# Patient Record
Sex: MALE | Race: WHITE | Employment: FULL TIME | URBAN - METROPOLITAN AREA
[De-identification: names, ages, dates, MRNs, and addresses within clinical notes are randomized per-mention and may not be internally consistent; named-entity substitution may affect disease eponyms.]

---

## 2019-03-27 LAB
ALBUMIN SERPL-MCNC: 4.2 G/DL
ALP BLD-CCNC: 57 U/L
ALT SERPL-CCNC: 30 U/L
ANION GAP SERPL CALCULATED.3IONS-SCNC: NORMAL MMOL/L
AST SERPL-CCNC: 25 U/L
BILIRUB SERPL-MCNC: 1 MG/DL (ref 0.1–1.4)
BUN BLDV-MCNC: 15 MG/DL
CALCIUM SERPL-MCNC: 9 MG/DL
CHLORIDE BLD-SCNC: 109 MMOL/L
CHOLESTEROL, TOTAL: 145 MG/DL
CHOLESTEROL/HDL RATIO: NORMAL
CO2: NORMAL MMOL/L
CREAT SERPL-MCNC: 1.2 MG/DL
GFR CALCULATED: NORMAL
GLUCOSE BLD-MCNC: 88 MG/DL
HDLC SERPL-MCNC: 53 MG/DL (ref 35–70)
LDL CHOLESTEROL CALCULATED: 84 MG/DL (ref 0–160)
POTASSIUM SERPL-SCNC: 4.2 MMOL/L
SODIUM BLD-SCNC: 139 MMOL/L
TOTAL PROTEIN: 7.5
TRIGL SERPL-MCNC: 38 MG/DL
VLDLC SERPL CALC-MCNC: NORMAL MG/DL

## 2019-04-16 ENCOUNTER — INITIAL CONSULT (OUTPATIENT)
Dept: CARDIOLOGY CLINIC | Age: 24
End: 2019-04-16
Payer: COMMERCIAL

## 2019-04-16 VITALS
WEIGHT: 196 LBS | BODY MASS INDEX: 25.15 KG/M2 | HEART RATE: 76 BPM | HEIGHT: 74 IN | SYSTOLIC BLOOD PRESSURE: 170 MMHG | DIASTOLIC BLOOD PRESSURE: 80 MMHG

## 2019-04-16 DIAGNOSIS — I51.7 LVH (LEFT VENTRICULAR HYPERTROPHY): Primary | ICD-10-CM

## 2019-04-16 PROCEDURE — 99243 OFF/OP CNSLTJ NEW/EST LOW 30: CPT | Performed by: INTERNAL MEDICINE

## 2019-04-16 RX ORDER — AMLODIPINE BESYLATE 5 MG/1
5 TABLET ORAL DAILY
Qty: 30 TABLET | Refills: 3 | Status: SHIPPED | OUTPATIENT
Start: 2019-04-16 | End: 2019-08-19 | Stop reason: SDUPTHER

## 2019-04-16 SDOH — HEALTH STABILITY: MENTAL HEALTH: HOW OFTEN DO YOU HAVE A DRINK CONTAINING ALCOHOL?: MONTHLY OR LESS

## 2019-05-02 ENCOUNTER — PROCEDURE VISIT (OUTPATIENT)
Dept: CARDIOLOGY CLINIC | Age: 24
End: 2019-05-02
Payer: COMMERCIAL

## 2019-05-02 DIAGNOSIS — I51.7 LVH (LEFT VENTRICULAR HYPERTROPHY): ICD-10-CM

## 2019-05-02 DIAGNOSIS — I10 ESSENTIAL HYPERTENSION: Primary | ICD-10-CM

## 2019-05-02 DIAGNOSIS — I51.7 LVH (LEFT VENTRICULAR HYPERTROPHY): Primary | ICD-10-CM

## 2019-05-02 LAB
LV EF: 58 %
LVEF MODALITY: NORMAL

## 2019-05-02 PROCEDURE — 93306 TTE W/DOPPLER COMPLETE: CPT | Performed by: INTERNAL MEDICINE

## 2019-05-02 PROCEDURE — 93976 VASCULAR STUDY: CPT | Performed by: INTERNAL MEDICINE

## 2019-05-07 ENCOUNTER — TELEPHONE (OUTPATIENT)
Dept: CARDIOLOGY CLINIC | Age: 24
End: 2019-05-07

## 2019-05-20 ENCOUNTER — TELEPHONE (OUTPATIENT)
Dept: CARDIOLOGY CLINIC | Age: 24
End: 2019-05-20

## 2019-05-20 NOTE — TELEPHONE ENCOUNTER
Called pt back and told him we can not see his labs if he had them done at [facility]. We recommend going there and getting a copy then bring them to office and we will scan them in.  Pt said he would go there today

## 2019-06-07 ENCOUNTER — TELEPHONE (OUTPATIENT)
Dept: CARDIOLOGY CLINIC | Age: 24
End: 2019-06-07

## 2019-06-07 NOTE — TELEPHONE ENCOUNTER
Patient is yting to get the results of labs we ordered , He had done @ [facility] on [address] they are telling him they don't send out results , He would have to go to [place] to  can we call and get results

## 2019-06-12 ENCOUNTER — TELEPHONE (OUTPATIENT)
Dept: CARDIOLOGY CLINIC | Age: 24
End: 2019-06-12

## 2019-06-12 NOTE — TELEPHONE ENCOUNTER
compunet refaxing results      Dr Debora Monique reviewed, left message for patient that results normal

## 2019-06-24 ENCOUNTER — OFFICE VISIT (OUTPATIENT)
Dept: CARDIOLOGY CLINIC | Age: 24
End: 2019-06-24
Payer: COMMERCIAL

## 2019-06-24 VITALS
HEIGHT: 74 IN | BODY MASS INDEX: 25.08 KG/M2 | SYSTOLIC BLOOD PRESSURE: 128 MMHG | DIASTOLIC BLOOD PRESSURE: 78 MMHG | WEIGHT: 195.4 LBS | HEART RATE: 84 BPM

## 2019-06-24 DIAGNOSIS — I10 ESSENTIAL HYPERTENSION: Primary | ICD-10-CM

## 2019-06-24 PROCEDURE — 99212 OFFICE O/P EST SF 10 MIN: CPT | Performed by: INTERNAL MEDICINE

## 2019-06-24 NOTE — PROGRESS NOTES
Irene Valencia MD        OFFICE  FOLLOWUP NOTE    Chief complaints: patient is here for management of HTN    Subjective: patient feels better, no chest pain, no shortness of breath, no dizziness, no palpitations    HPI Oly Terrell is a 21 y. o.year old who  has a past medical history of Diarrhea, H/O Doppler renal arterial ultrasound, H/O echocardiogram, Hypertension, and Lymphadenopathy. and presents for management of CAD, DM, HTN, AFIB, which are well controlled      Current Outpatient Medications   Medication Sig Dispense Refill    amLODIPine (NORVASC) 5 MG tablet Take 1 tablet by mouth daily 30 tablet 3     No current facility-administered medications for this visit. Allergies: Patient has no known allergies. Past Medical History:   Diagnosis Date    Diarrhea     H/O Doppler renal arterial ultrasound 05/02/2019    Normal renal artery doppler.  H/O echocardiogram 05/02/2019    EF 55-60%, Normal study.  Hypertension     Lymphadenopathy      History reviewed. No pertinent surgical history. History reviewed. No pertinent family history.   Social History     Tobacco Use    Smoking status: Never Smoker    Smokeless tobacco: Never Used   Substance Use Topics    Alcohol use: Yes     Frequency: Monthly or less      @ANN@  Review of Systems:   · Constitutional: No Fever or Weight Loss   · Eyes: No Decreased Vision  · ENT: No Headaches, Hearing Loss or Vertigo  · Cardiovascular: No chest pain, dyspnea on exertion, palpitations or loss of consciousness  · Respiratory: No cough or wheezing    · Gastrointestinal: No abdominal pain, appetite loss, blood in stools, constipation, diarrhea or heartburn  · Genitourinary: No dysuria, trouble voiding, or hematuria  · Musculoskeletal:  No gait disturbance, weakness or joint complaints  · Integumentary: No rash or pruritis  · Neurological: No TIA or stroke symptoms  · Psychiatric: No anxiety or depression  · Endocrine: No malaise, fatigue or temperature intolerance  · Hematologic/Lymphatic: No bleeding problems, blood clots or swollen lymph nodes  · Allergic/Immunologic: No nasal congestion or hives  All systems negative except as marked. Objective:  /78   Pulse 84   Ht 6' 2\" (1.88 m)   Wt 195 lb 6.4 oz (88.6 kg)   BMI 25.09 kg/m²   Wt Readings from Last 3 Encounters:   06/24/19 195 lb 6.4 oz (88.6 kg)   04/16/19 196 lb (88.9 kg)     Body mass index is 25.09 kg/m². GENERAL - Alert, oriented, pleasant, in no apparent distress,normal grooming  HEENT - pupils are reactive to light and accomodation, cornea intact, conjunctive normal color, ears are normal in exam,throat exam in normal, teeth, gum and palate are normal, oral mucosa is normal without any notation of pallor or cyanosis  Neck - Supple. No jugular venous distention noted. No carotid bruits, no apical -carotid delay  Respiratory:  Normal breath sounds, No respiratory distress, No wheezing, No chest tenderness. ,no use of accessory muscles, diaphragm movement is normal  Cardiovascular: (PMI) apex non displaced,no lifts no thrills, no s3,no s4, Normal heart rate, Normal rhythm, No murmurs, No rubs, No gallops. Carotid arteries pulse and amplitude are normal no bruit, no abdominal bruit noted ( normal abdominal aorta ausculation), femoral arteries pulse and amplitude are normal no bruit, pedal pulses are normal  Femoral pulses have normal amplitude, no bruits   Extremities - No cyanosis, clubbing, or significant edema, no varicose veins    Abdomen - No masses, tenderness, or organomegaly, no hepato-splenomegally, no bruits  Musculoskeletal - No significant edema, no kyphosis or scoliosis, no deformity in any extremity noted, muscle strength and tone are normal  Skin: no ulcer,no scar,no stasis dermatitis   Neurologic - alert oriented times 3,Cranial nerves II through XII are grossly intact. There were no gross focal neurologic abnormalities.  All sensory and motor nerves examined and were normal  Psychiatric: normal mood and affect    No results found for: CKTOTAL, CKMB, CKMBINDEX, TROPONINI  BNP:  No results found for: BNP  PT/INR:  No results found for: PTINR  No results found for: LABA1C  Lab Results   Component Value Date    CHOL 145 03/27/2019    TRIG 38 03/27/2019    HDL 53 03/27/2019    LDLCALC 84 03/27/2019     Lab Results   Component Value Date    ALT 30 03/27/2019    AST 25 03/27/2019     TSH:  No results found for: TSH    Impression:  Jay Mcgarry is a 21 y. o.year old who  has a past medical history of Diarrhea, H/O Doppler renal arterial ultrasound, H/O echocardiogram, Hypertension, and Lymphadenopathy. and presents with     Plan:    12. HTN: CONTROLLED, continue norvasc,normal labs ( metanephrines, renin aldosterone), normal renal doppler,will recommend to continue norvasc and get SLEEP STYDY    All labs, medications and tests reviewed, continue all other medications of all above medical condition listed as is.     [unfilled]

## 2019-07-09 ENCOUNTER — TELEPHONE (OUTPATIENT)
Dept: CARDIOLOGY CLINIC | Age: 24
End: 2019-07-09

## 2019-07-09 DIAGNOSIS — I15.8 OTHER SECONDARY HYPERTENSION: Primary | ICD-10-CM

## 2019-07-25 ENCOUNTER — HOSPITAL ENCOUNTER (OUTPATIENT)
Dept: SLEEP CENTER | Age: 24
Discharge: HOME | End: 2019-07-25
Payer: COMMERCIAL

## 2019-07-25 VITALS
DIASTOLIC BLOOD PRESSURE: 62 MMHG | HEART RATE: 66 BPM | HEIGHT: 74 IN | SYSTOLIC BLOOD PRESSURE: 140 MMHG | WEIGHT: 191 LBS | OXYGEN SATURATION: 100 % | BODY MASS INDEX: 24.51 KG/M2

## 2019-07-25 DIAGNOSIS — G47.33 OSA (OBSTRUCTIVE SLEEP APNEA): ICD-10-CM

## 2019-07-25 DIAGNOSIS — G47.19 EXCESSIVE DAYTIME SLEEPINESS: ICD-10-CM

## 2019-07-25 PROCEDURE — 99211 OFF/OP EST MAY X REQ PHY/QHP: CPT | Performed by: INTERNAL MEDICINE

## 2019-07-25 PROCEDURE — 99203 OFFICE O/P NEW LOW 30 MIN: CPT | Performed by: INTERNAL MEDICINE

## 2019-07-25 ASSESSMENT — SLEEP AND FATIGUE QUESTIONNAIRES
HOW LIKELY ARE YOU TO NOD OFF OR FALL ASLEEP WHILE LYING DOWN TO REST IN THE AFTERNOON WHEN CIRCUMSTANCES PERMIT: 0
HOW LIKELY ARE YOU TO NOD OFF OR FALL ASLEEP WHILE SITTING AND READING: 1
HOW LIKELY ARE YOU TO NOD OFF OR FALL ASLEEP WHEN YOU ARE A PASSENGER IN A CAR FOR AN HOUR WITHOUT A BREAK: 0
HOW LIKELY ARE YOU TO NOD OFF OR FALL ASLEEP WHILE SITTING QUIETLY AFTER LUNCH WITHOUT ALCOHOL: 0
HOW LIKELY ARE YOU TO NOD OFF OR FALL ASLEEP IN A CAR, WHILE STOPPED FOR A FEW MINUTES IN TRAFFIC: 0
HOW LIKELY ARE YOU TO NOD OFF OR FALL ASLEEP WHILE SITTING INACTIVE IN A PUBLIC PLACE: 0
ESS TOTAL SCORE: 1
HOW LIKELY ARE YOU TO NOD OFF OR FALL ASLEEP WHILE SITTING AND TALKING TO SOMEONE: 0
HOW LIKELY ARE YOU TO NOD OFF OR FALL ASLEEP WHILE WATCHING TV: 0

## 2019-07-25 NOTE — CONSULTS
Talks on phone: Not on file     Gets together: Not on file     Attends Religion service: Not on file     Active member of club or organization: Not on file     Attends meetings of clubs or organizations: Not on file     Relationship status: Not on file    Intimate partner violence:     Fear of current or ex partner: Not on file     Emotionally abused: Not on file     Physically abused: Not on file     Forced sexual activity: Not on file   Other Topics Concern    Not on file   Social History Narrative    Not on file       Prior to Admission medications    Medication Sig Start Date End Date Taking? Authorizing Provider   amLODIPine (NORVASC) 5 MG tablet Take 1 tablet by mouth daily 4/16/19  Yes Maria D Preciado MD       Allergies as of 07/25/2019    (No Known Allergies)       Patient Active Problem List   Diagnosis    CATARINO (obstructive sleep apnea)    Excessive daytime sleepiness       Past Medical History:   Diagnosis Date    Diarrhea     H/O Doppler renal arterial ultrasound 05/02/2019    Normal renal artery doppler.  H/O echocardiogram 05/02/2019    EF 55-60%, Normal study.  Hypertension     Lymphadenopathy        Past Surgical History:   Procedure Laterality Date    VASCULAR SURGERY      varicose veins       Family History   Problem Relation Age of Onset    Arthritis Father     High Blood Pressure Father     High Cholesterol Father     Obesity Father     Substance Abuse Father     Early Death Paternal Uncle         mid to late 29's    Heart Attack Paternal Uncle     Heart Disease Paternal Uncle     Mental Illness Paternal Uncle     Arthritis Paternal Grandmother     High Cholesterol Paternal Grandmother     Alcohol Abuse Paternal Grandfather          Objective:   BP (!) 140/62   Pulse 66   Ht 6' 2\" (1.88 m)   Wt 191 lb (86.6 kg)   SpO2 100%   BMI 24.52 kg/m²   Body mass index is 24.52 kg/m².   Sleep Medicine 7/25/2019   Sitting and reading 1   Watching TV 0   Sitting, inactive in a public place (e.g. a theatre or a meeting) 0   As a passenger in a car for an hour without a break 0   Lying down to rest in the afternoon when circumstances permit 0   Sitting and talking to someone 0   Sitting quietly after a lunch without alcohol 0   In a car, while stopped for a few minutes in traffic 0   Total score 1   Neck circumference 15.75     {MALLAMPATI:2    Vitals:    07/25/19 1137   BP: (!) 140/62   Pulse: 66   SpO2: 100%   Weight: 191 lb (86.6 kg)   Height: 6' 2\" (1.88 m)     Neck circumference: 15.75  Inches  Gardiner - Total score: 1    Gen: No distress. Eyes: PERRL. No sclera icterus. No conjunctival injection. ENT: No discharge. Pharynx clear. External appearance of ears and nose normal.Mild OVERJET  Neck: Trachea midline. No obvious mass. Resp: No accessory muscle use. No crackles. No wheezes. No rhonchi. No dullness on percussion. CV: Regular rate. Regular rhythm. No murmur or rub. No edema. GI: Non-tender. Non-distended. No hernia. Skin: Warm, dry, normal texture and turgor. No nodule on exposed extremities. Lymph: No cervical LAD. No supraclavicular LAD. M/S: No cyanosis. No clubbing. No joint deformity. Psych: Oriented x 3. No anxiety. Awake. Alert. Intact judgement and insight.     Mallampati Airway Classification:   []1 []2 [x]3 []4        Assessment and Plan     Diagnosis:    Problem List     CATARINO (obstructive sleep apnea)     He has symptoms that are consistent with CATARINO  Advised to go for the sleep study           Relevant Orders    Home Sleep Study    Excessive daytime sleepiness     Advised to go for the sleep study  Loose weight                     Additional Plan:     [x]  Sleep hygiene/ relaxation methods & CBTi principles review with patient   [x]  Avoid supine/back sleep until sleep study   [x]  Driving precautions   [x]  Medical consequences of untreated CATARINO   [x]  Weight loss recommendations   [x]  Diet recommendations   [x]  Exercise   []  Advised to quit smoking       []  PFT referral   []  Bariatric Program referral      Follow-Up:    No follow-ups on file.     Electronically signed by Denzel Colin MD on 7/25/2019 at 11:50 AM

## 2019-08-15 ENCOUNTER — HOSPITAL ENCOUNTER (OUTPATIENT)
Dept: SLEEP CENTER | Age: 24
Discharge: HOME | End: 2019-08-15
Payer: COMMERCIAL

## 2019-08-15 PROCEDURE — G0398 HOME SLEEP TEST/TYPE 2 PORTA: HCPCS

## 2019-08-19 RX ORDER — AMLODIPINE BESYLATE 5 MG/1
5 TABLET ORAL DAILY
Qty: 30 TABLET | Refills: 5 | Status: SHIPPED | OUTPATIENT
Start: 2019-08-19 | End: 2020-03-02

## 2019-08-22 PROCEDURE — 95800 SLP STDY UNATTENDED: CPT | Performed by: INTERNAL MEDICINE

## 2019-08-25 NOTE — PROGRESS NOTES
Results for the most recent sleep study on Moses Noe  1995 are finalized and available. Please see media tab.     Electronically signed by Ashish Morrow on 8/24/2019 at 9:43 PM

## 2019-08-28 ENCOUNTER — HOSPITAL ENCOUNTER (OUTPATIENT)
Dept: SLEEP CENTER | Age: 24
Discharge: HOME | End: 2019-08-28
Payer: COMMERCIAL

## 2019-08-28 DIAGNOSIS — G47.19 EXCESSIVE DAYTIME SLEEPINESS: ICD-10-CM

## 2019-08-28 DIAGNOSIS — G47.33 OSA (OBSTRUCTIVE SLEEP APNEA): ICD-10-CM

## 2019-08-28 PROCEDURE — 9990000010 HC NO CHARGE VISIT: Performed by: INTERNAL MEDICINE

## 2019-08-28 PROCEDURE — 99214 OFFICE O/P EST MOD 30 MIN: CPT | Performed by: INTERNAL MEDICINE

## 2019-08-28 ASSESSMENT — ENCOUNTER SYMPTOMS
ABDOMINAL DISTENTION: 0
SHORTNESS OF BREATH: 0
EYE ITCHING: 0
BACK PAIN: 0
EYE DISCHARGE: 0
ABDOMINAL PAIN: 0
COUGH: 0

## 2019-08-28 NOTE — PROGRESS NOTES
In a car, while stopped for a few minutes in traffic 0   Total score 1   Neck circumference 15.75     {MALLAMPATI:2    Physical Exam   Constitutional: He is oriented to person, place, and time. He appears well-developed and well-nourished. HENT:   Head: Normocephalic and atraumatic. Eyes: Pupils are equal, round, and reactive to light. EOM are normal.   Neck: Normal range of motion. Neck supple. Cardiovascular: Normal rate, regular rhythm and normal heart sounds. Pulmonary/Chest: Effort normal and breath sounds normal.   Abdominal: Soft. Bowel sounds are normal.   Musculoskeletal: Normal range of motion. Neurological: He is alert and oriented to person, place, and time. Skin: Skin is warm and dry. Psychiatric: He has a normal mood and affect. His behavior is normal.   Vitals reviewed. Radiology: None    Assessment and Plan     Problem List     CATARINO (obstructive sleep apnea)     His HST is negative for CATARINO  Advised to go for the inlab sleep study         Relevant Orders    Baseline Diagnostic Sleep Study    Assessment of Daytime Sleepiness    Excessive daytime sleepiness     His HST is negative  Advised to go for the inlab sleep study         Relevant Orders    Baseline Diagnostic Sleep Study    Assessment of Daytime Sleepiness               No follow-ups on file.      Progress notes sent to the referring Provider    Janice Flores MD  8/28/2019  3:57 PM

## 2019-09-12 ENCOUNTER — TELEPHONE (OUTPATIENT)
Dept: CARDIOLOGY CLINIC | Age: 24
End: 2019-09-12

## 2020-03-02 RX ORDER — AMLODIPINE BESYLATE 5 MG/1
5 TABLET ORAL DAILY
Qty: 30 TABLET | Refills: 11 | Status: SHIPPED | OUTPATIENT
Start: 2020-03-02 | End: 2021-03-17

## 2020-03-02 RX ORDER — AMLODIPINE BESYLATE 5 MG/1
5 TABLET ORAL DAILY
Qty: 30 TABLET | Refills: 5 | OUTPATIENT
Start: 2020-03-02

## 2020-06-26 ENCOUNTER — TELEMEDICINE (OUTPATIENT)
Dept: CARDIOLOGY CLINIC | Age: 25
End: 2020-06-26
Payer: COMMERCIAL

## 2020-06-26 PROCEDURE — 99212 OFFICE O/P EST SF 10 MIN: CPT | Performed by: INTERNAL MEDICINE

## 2020-06-26 NOTE — PROGRESS NOTES
García Cabrera MD    TELEHEALTH EVALUATION -- Audio/Visual (During GMTQK-71 public health emergency)      Chief complaints: patient is here for management of htn    Subjective: patient feels better, no chest pain, no shortness of breath, no dizziness, no palpitations    HPI Pao Villaseñor is a 31 y. o.year old who  has a past medical history of Diarrhea, H/O Doppler renal arterial ultrasound, H/O echocardiogram, Hypertension, and Lymphadenopathy. and presents for management of CAD, DM, HTN, AFIB, which are well controlled      Current Outpatient Medications   Medication Sig Dispense Refill    amLODIPine (NORVASC) 5 MG tablet Take 1 tablet by mouth daily 30 tablet 11     No current facility-administered medications for this visit. Allergies: Patient has no known allergies. Past Medical History:   Diagnosis Date    Diarrhea     H/O Doppler renal arterial ultrasound 05/02/2019    Normal renal artery doppler.  H/O echocardiogram 05/02/2019    EF 55-60%, Normal study.     Hypertension     Lymphadenopathy      Past Surgical History:   Procedure Laterality Date    VASCULAR SURGERY      varicose veins     Family History   Problem Relation Age of Onset    Arthritis Father     High Blood Pressure Father     High Cholesterol Father     Obesity Father     Substance Abuse Father     Early Death Paternal Uncle         mid to late 26's    Heart Attack Paternal Uncle     Heart Disease Paternal Uncle     Mental Illness Paternal Uncle     Arthritis Paternal Grandmother     High Cholesterol Paternal Grandmother     Alcohol Abuse Paternal Grandfather      Social History     Tobacco Use    Smoking status: Never Smoker    Smokeless tobacco: Never Used   Substance Use Topics    Alcohol use: Yes     Frequency: Monthly or less      @ANN@  Review of Systems:   · Constitutional: No Fever or Weight Loss   · Eyes: No Decreased Vision  · ENT: No Headaches, Hearing Loss or Vertigo  · Cardiovascular: No chest exanthematous lesions or discoloration noted on facial skin         [] Abnormal-            Psychiatric:       [x] Normal Affect [x] No Hallucinations        [] Abnormal-     Other pertinent observable physical exam findings-     No results found for: CKTOTAL, CKMB, CKMBINDEX, TROPONINI  BNP:  No results found for: BNP  PT/INR:  No results found for: PTINR  No results found for: LABA1C  Lab Results   Component Value Date    CHOL 145 03/27/2019    TRIG 38 03/27/2019    HDL 53 03/27/2019    LDLCALC 84 03/27/2019     Lab Results   Component Value Date    ALT 30 03/27/2019    AST 25 03/27/2019     TSH:  No results found for: TSH    Impression:  Pao Villaseñor is a 31 y. o.year old who  has a past medical history of Diarrhea, H/O Doppler renal arterial ultrasound, H/O echocardiogram, Hypertension, and Lymphadenopathy. and presents with     Plan:  1. Htn: CONTROLLED, continue norvasc 5 danielle daily  2. Health maintenance: exerise and diet  All labs, medications and tests reviewed, continue all other medications of all above medical condition listed as is. Yahaira Garsia is a 31 y.o. male being evaluated by a Virtual Visit (video visit) encounter to address concerns as mentioned above. A caregiver was present when appropriate. Due to this being a TeleHealth encounter (During OGCBS-60 public health emergency), evaluation of the following organ systems was limited: Vitals/Constitutional/EENT/Resp/CV/GI//MS/Neuro/Skin/Heme-Lymph-Imm. Pursuant to the emergency declaration under the [facility], [address] [place] [address] authority and the [employer] and [employer] Act, this Virtual Visit was conducted with patient's (and/or legal guardian's) consent, to reduce the patient's risk of exposure to COVID-19 and provide necessary medical care.   The patient (and/or legal guardian) has also been advised to contact this office for worsening conditions or problems, and seek emergency medical treatment and/or call 911 if deemed necessary. Services were provided through a video synchronous discussion virtually to substitute for in-person clinic visit. Patient and provider were located at their individual homes. 1.   I confirm that this visit was completed in a telehealth setting ,using synchronous audiovisual technology for real time patient interaction . The patient identity with name and date of birth was confirmed . This evaluation of patient was done by telehealth in the setting of [address] , which precluded assurance of safe in person visit at the time of service. The patient consented to and accepts potential risks associated with telemedical evaluation and care was taken to assess keira presence of any medical issues that would be more  appropriate for expedited in -person care. Pursuant to the emergency declaration under the [address] [place] [address], [facility] waiver authority and the [employer] and [employer] Act, this Virtual  Visit was conducted, with patient's consent, to reduce the patient's risk of exposure to COVID-19 and provide continuity of care for an established patient. Services were provided through a video synchronous discussion virtually to substitute for in-person clinic visit. 2. I Affirm this is a Patient Initiated Episode with an Established Patient who has not had a related appointment within my department in the past 7 days or scheduled within the next 24 hours. --Janet Ayala MD on 6/26/2020 at 8:20 AM    An electronic signature was used to authenticate this note.

## 2020-06-30 ENCOUNTER — TELEPHONE (OUTPATIENT)
Dept: CARDIOLOGY CLINIC | Age: 25
End: 2020-06-30

## 2020-09-30 ENCOUNTER — OFFICE VISIT (OUTPATIENT)
Dept: ORTHOPEDIC SURGERY | Age: 25
End: 2020-09-30
Payer: COMMERCIAL

## 2020-09-30 VITALS
OXYGEN SATURATION: 98 % | HEIGHT: 74 IN | BODY MASS INDEX: 24.51 KG/M2 | RESPIRATION RATE: 16 BRPM | HEART RATE: 64 BPM | WEIGHT: 191 LBS

## 2020-09-30 PROCEDURE — 99202 OFFICE O/P NEW SF 15 MIN: CPT | Performed by: PHYSICIAN ASSISTANT

## 2020-09-30 PROCEDURE — 20610 DRAIN/INJ JOINT/BURSA W/O US: CPT | Performed by: PHYSICIAN ASSISTANT

## 2020-09-30 NOTE — PROGRESS NOTES
Patient is a 31year old male. Patient is in the office today with right shoulder pain. Patient states the pain is mainly in the anterior part of the shoulder. Patient states that the pain is a 4/10 today and when he moves the shoulder the pain radiates and causes to patient to stop and rest the arm for a minute. Patient states that he injuried the shoulder almost 7-8 years ago back in high school when he was trying to max out on a bend press and he felt a pop within the shoulder. Patient states since then the shoulder gives him issues someday, but other days he feels fine. Patient has been using icy hot and RICE method for the right shoulder. Patient states that he is now doing boxing and has noticed some changed with his punches when his arm is inflamed and very painful. Patient denies any other injury to the shoulder and hasn't had PT or a cortisone injection.

## 2020-09-30 NOTE — PATIENT INSTRUCTIONS
Continue weight bear as tolerated  Continue range of motion  Ice and elevate as needed  Tylenol or Motrin for pain  Injection given today in the office   Rest for 24-48 hours  Virtual visit in 4 week to see if the injection helped or not

## 2020-10-06 ASSESSMENT — ENCOUNTER SYMPTOMS
GASTROINTESTINAL NEGATIVE: 1
RESPIRATORY NEGATIVE: 1
EYES NEGATIVE: 1

## 2020-10-28 ENCOUNTER — VIRTUAL VISIT (OUTPATIENT)
Dept: ORTHOPEDIC SURGERY | Age: 25
End: 2020-10-28
Payer: COMMERCIAL

## 2020-10-28 PROCEDURE — 99441 PR PHYS/QHP TELEPHONE EVALUATION 5-10 MIN: CPT | Performed by: PHYSICIAN ASSISTANT

## 2021-03-18 RX ORDER — AMLODIPINE BESYLATE 5 MG/1
5 TABLET ORAL DAILY
Qty: 30 TABLET | Refills: 11 | Status: SHIPPED | OUTPATIENT
Start: 2021-03-18 | End: 2022-04-04

## 2021-07-19 ENCOUNTER — OFFICE VISIT (OUTPATIENT)
Dept: CARDIOLOGY CLINIC | Age: 26
End: 2021-07-19
Payer: COMMERCIAL

## 2021-07-19 VITALS
HEIGHT: 74 IN | WEIGHT: 200.2 LBS | DIASTOLIC BLOOD PRESSURE: 64 MMHG | SYSTOLIC BLOOD PRESSURE: 132 MMHG | HEART RATE: 67 BPM | BODY MASS INDEX: 25.69 KG/M2

## 2021-07-19 DIAGNOSIS — I10 ESSENTIAL HYPERTENSION: Primary | ICD-10-CM

## 2021-07-19 PROCEDURE — 93000 ELECTROCARDIOGRAM COMPLETE: CPT | Performed by: NURSE PRACTITIONER

## 2021-07-19 PROCEDURE — 99213 OFFICE O/P EST LOW 20 MIN: CPT | Performed by: NURSE PRACTITIONER

## 2021-07-19 NOTE — PROGRESS NOTES
[facility] [address] [facility],Third [address]  Phone: [phone]    Fax [phone]                  Roman Frazier MD, Karishma Figueroa MD, [address] MD John, MD Ha Dubose MD Ivin Herring, MD Lorilee Price, ADRIEN Arita, ADRIEN Hurtado, APRSLOAN     [facility], APRN        Cardiology Progress Note      7/19/2021    RE: Samantha Koo  (1995)                             Primary cardiologist: Dr. Ha Malone       Subjective:  CC:   1. Essential hypertension        HPI: Samantha Koo, who is a  31y.o. year old male with a past medical history as listed below. Patient presents to the office for follow up on HTN. Patient is  an active male who walks regularly. Patient is  compliant with medications. Patient denies any chest pain, shortness of breath, dizziness, syncope, or palpitations. Past Medical History:   Diagnosis Date    Diarrhea     H/O Doppler renal arterial ultrasound 05/02/2019    Normal renal artery doppler.  H/O echocardiogram 05/02/2019    EF 55-60%, Normal study.  Hypertension     Lymphadenopathy        Current Outpatient Medications   Medication Sig Dispense Refill    amLODIPine (NORVASC) 5 MG tablet Take 1 tablet by mouth daily 30 tablet 11     No current facility-administered medications for this visit. Review of Systems:  Review of Systems   Cardiovascular: Negative for chest pain, palpitations and leg swelling. Musculoskeletal: Negative. Skin: Negative. Neurological: Negative for dizziness and weakness. All other systems reviewed and are negative. Objective:      Physical Exam:  /64   Pulse 67   Ht 6' 2\" (1.88 m)   Wt 200 lb 3.2 oz (90.8 kg)   BMI 25.70 kg/m²   Wt Readings from Last 3 Encounters:   07/19/21 200 lb 3.2 oz (90.8 kg)   09/30/20 191 lb (86.6 kg)   07/25/19 191 lb (86.6 kg)     Body mass index is 25.7 kg/m².     Physical exam:  Physical Exam  Constitutional:       Appearance: He is well-developed. Cardiovascular:      Rate and Rhythm: Normal rate and regular rhythm. Pulses: Intact distal pulses. Dorsalis pedis pulses are 2+ on the right side and 2+ on the left side. Posterior tibial pulses are 2+ on the right side and 2+ on the left side. Heart sounds: Normal heart sounds, S1 normal and S2 normal.   Pulmonary:      Effort: Pulmonary effort is normal.      Breath sounds: Normal breath sounds. Musculoskeletal:         General: Normal range of motion. Skin:     General: Skin is warm and dry. Neurological:      Mental Status: He is alert and oriented to person, place, and time. DATA:  No results found for: CKTOTAL, CKMB, CKMBINDEX, TROPONINI  BNP:  No results found for: BNP  PT/INR:  No results found for: PTINR  No results found for: LABA1C  Lab Results   Component Value Date    CHOL 145 03/27/2019    TRIG 38 03/27/2019    HDL 53 03/27/2019    LDLCALC 84 03/27/2019     Lab Results   Component Value Date    ALT 30 03/27/2019    AST 25 03/27/2019     TSH:  No results found for: TSH    Vitals:    07/19/21 1059   BP: 132/64   Pulse: 67       Echo:5/2/19  Left ventricular systolic function is normal with an ejection fraction of   55-60%. No significant valvular disease or diastolic dysfunction noted. Essentially normal echocardiogram.       The ASCVD Risk score (Lisandro Smith et al., 2013) failed to calculate for the following reasons: The 2013 ASCVD risk score is only valid for ages 36 to 78      Assessment/ Plan:     Essential hypertension   Stable, continue Norvasc 5 mg daily. Renal ultrasound normal and echocardiogram did not show any structural abnormalities. Patient seen, interviewed and examined. Testing was reviewed. Patient is encouraged to exercise even a brisk walk for 30 minutes at least 3 to 4 times a week. Lifestyle and risk factor modificatons discussed.  Various goals are discussed

## 2021-07-19 NOTE — LETTER
Javier Kong  1995  [id number]    Have you had any Chest Pain that is not new? - No  If Yes DO EKG - How does it feel -   How long does the pain last -      How long have you been having the pain -    Did you take a    And did it relieve the pain -      DO EKG IF: Patient has a Heart Rate above 100 or below 40     CAD (Coronary Artery Disease) patient should have one on file every 6 months        Have you had any Shortness of Breath - No  If Yes - When     Have you had any dizziness - No  If Yes DO ORTHOSTATIC BP - when do you feel dizzy    How long does it last .        Sitting wait 5 minutes do supine (laying down) wait 5 minutes then do standing - log each in \"vitals\" area in Epic   Be sure to ask what symptoms they are having if they get dizzy while completing ortho stats such as: room spinning, nausea, etc.    Have you had any palpitations that are not new? - No  If Yes DO EKG - Do you feel your heart   How long does it last - . Is the patient on any of the following medications -     If Yes DO EKG - Needs done every 6 months    Do you have any edema - swelling in No    If Yes - CHECK TO SEE IF THE EDEMA IS PITTING  How long have they been having edema -   If Yes - Have they worn compression stockings   If they have worn compression stockings      Vein \"LEG PROBLEM Questionnaire\"  1. Do you have prominent leg veins? No   2. Do you have any skin discoloration? No  3. Do you have any healed or active sores? No  4. Do you have swelling of the legs? No  5. Do you have a family history of varicose veins? No  6. Does your profession involve pro-longed        standing or heavy lifting? No  7. Have you been fighting overweight problems? No  8. Do you have restless legs? Yes  9. Do you have any night time cramps? No  10.  Do you have any of the following in your legs:             When did you have your last labs drawn 2019  Where did you have them done   What doctor ordered     If we do not have these labs you are retrieve these labs for these providers! Do you have a surgery or procedure scheduled in the near future - No  If Yes- DO EKG  If Yes - Who is the surgery with?    Phone number of physician   Fax number of physician   Type of surgery   GIVE THIS INFORMATION TO SHAI POLANCO     Ask patient if they want to sign up for MyChart if they are not already signed up     Check to see if we have an E-MAIL on file for the patient     Check medication list thoroughly!!! AND RECONCILE OUTSIDE MEDICATIONS  If dose has changed change the entire order not just the [place] At check out add to every patient's \"wrap up\" the following dot phrase AFTERHOURSEDUCATION and ensure we explain this to our patients

## 2022-01-31 ENCOUNTER — OFFICE VISIT (OUTPATIENT)
Dept: ORTHOPEDIC SURGERY | Age: 27
End: 2022-01-31

## 2022-01-31 VITALS
OXYGEN SATURATION: 99 % | HEART RATE: 91 BPM | WEIGHT: 200 LBS | HEIGHT: 74 IN | BODY MASS INDEX: 25.67 KG/M2 | RESPIRATION RATE: 16 BRPM

## 2022-01-31 DIAGNOSIS — S43.431A SUPERIOR LABRUM ANTERIOR-TO-POSTERIOR (SLAP) TEAR OF RIGHT SHOULDER: Primary | ICD-10-CM

## 2022-01-31 DIAGNOSIS — S69.81XA TFCC (TRIANGULAR FIBROCARTILAGE COMPLEX) INJURY, RIGHT, INITIAL ENCOUNTER: ICD-10-CM

## 2022-01-31 PROCEDURE — 99213 OFFICE O/P EST LOW 20 MIN: CPT | Performed by: PHYSICIAN ASSISTANT

## 2022-01-31 NOTE — PROGRESS NOTES
[facility]      HPI:  Lisa Benjamin is a 31 y.o. male that presents to the office today complaining of right shoulder pain. Previously he has been seen in this office and had a steroid injection in the right shoulder which did help some with the pain. He states that the pain is not present all the time but only with certain activities that he is doing. He has been going to the gym and working out and doing some shoulder exercises and certain ones cause him sharp burning type pain within the anterior aspect of the shoulder radiating into the biceps at times. He states that one of the worst exercises is the pec fly machine and he will noticed when he increases his weight he has sharp pain within the shoulder joint itself. He states that when throwing a ball he has pain as well in the anterior part of his shoulder. Past Medical History:   Diagnosis Date    Diarrhea     H/O Doppler renal arterial ultrasound 05/02/2019    Normal renal artery doppler.  H/O echocardiogram 05/02/2019    EF 55-60%, Normal study.     Hypertension     Lymphadenopathy        Past Surgical History:   Procedure Laterality Date    VASCULAR SURGERY      varicose veins       Family History   Problem Relation Age of Onset    Clotting Disorder Mother     Arthritis Father     High Blood Pressure Father     High Cholesterol Father     Obesity Father     Substance Abuse Father     Early Death Paternal Uncle         mid to late 29's    Heart Attack Paternal Uncle     Heart Disease Paternal Uncle     Mental Illness Paternal Uncle     Arthritis Paternal Grandmother     High Cholesterol Paternal Grandmother     Alcohol Abuse Paternal Grandfather     Clotting Disorder Maternal Grandfather        Social History     Socioeconomic History    Marital status:      Spouse name: None    Number of children: None    Years of education: None    Highest education level: None   Occupational History  None   Tobacco Use    Smoking status: Never Smoker    Smokeless tobacco: Never Used   Vaping Use    Vaping Use: Never used   Substance and Sexual Activity    Alcohol use: Yes     Comment: caffine occa    Drug use: Never    Sexual activity: None   Other Topics Concern    None   Social History Narrative    None     Social Determinants of Health     Financial Resource Strain:     Difficulty of Paying Living Expenses: Not on file   Food Insecurity:     Worried About Running Out of Food in the Last Year: Not on file    Darshan of Food in the Last Year: Not on file   Transportation Needs:     Lack of Transportation (Medical): Not on file    Lack of Transportation (Non-Medical): Not on file   Physical Activity:     Days of Exercise per Week: Not on file    Minutes of Exercise per Session: Not on file   Stress:     Feeling of Stress : Not on file   Social Connections:     Frequency of Communication with Friends and Family: Not on file    Frequency of Social Gatherings with Friends and Family: Not on file    Attends Mormonism Services: Not on file    Active Member of [address] [place] [address] or Organizations: Not on file    Attends Club or Organization Meetings: Not on file    Marital Status: Not on file   Intimate Partner Violence:     Fear of Current or Ex-Partner: Not on file    Emotionally Abused: Not on file    Physically Abused: Not on file    Sexually Abused: Not on file   Housing Stability:     Unable to Pay for Housing in the Last Year: Not on file    Number of [place] in the Last Year: Not on file    Unstable Housing in the Last Year: Not on file       Current Outpatient Medications   Medication Sig Dispense Refill    amLODIPine (NORVASC) 5 MG tablet Take 1 tablet by mouth daily 30 tablet 11     No current facility-administered medications for this visit.        No Known Allergies    Vitals:    01/31/22 0931   Pulse: 91   Resp: 16   SpO2: 99%   Weight: 200 lb (90.7 kg)   Height: 6' 2\" (1.88 m) Review of Systems:   Review of Systems   Constitutional: Negative. HENT: Negative. Eyes: Negative. Respiratory: Negative. Cardiovascular: Negative. Gastrointestinal: Negative. Genitourinary: Negative. Musculoskeletal: Positive for arthralgias and myalgias. Skin: Negative. Negative for rash and wound. Neurological: Negative. Psychiatric/Behavioral: Negative. Physical Exam:   Gen/Psych:Examination reveals a pleasant individual in no acute distress. The patient is oriented to time, place and person. The patient's mood and affect are appropriate. Patient appears well nourished. Body habitus is normal    HEENT: Head is atraumatic normocephalic, ears are symmetric, eyes show equal pupils bilaterally, extraocular muscles intact. Hearing is intact to normal voice at 5 feet. Nares are patent bilaterally, no epistaxis, no rhinorrhea. Lymph:  No obvious lymphedema in bilateral upper extremities     Skin: Intact in bilateral upper extremities with no ulcerations, lesions, rash, erythema. Vascular: There are no varicosities in bilateral upper  extremities, sensation intact to light touch over bilateral upper extremities. Musculoskeletal:  Right shoulder exam:  Skin:  Clear with no erythema, there is no significant joint effusion  Deformity:  none  Atrophy:  none  Tenderness: Glenohumeral joint, biceps tendon  Active ROM:   FE:180    IR side: L1           ER side: 60   Ad/Abduction: 180      Passive ROM is the same as active    Strength: FE:5/5     ER:5/5 IR:5/5      Elbow flexion: 5/5  Neer:  not positive  Garcia:  not positive  Yergason:mildly positive  Wheeler:  mildly positive     Cervical Spine tenderness: no    Right Hand:  Inspection: No erythema, no edema, no ecchymosis. Palpation: There is tenderness to palpation along the ulnar aspect of the wrist and pain is worse with ulnar deviation and supination.   Range of motion: 60° extension, 60° flexion, 10° ulnar deviation, 5° radial deviation  Strength:  strength is 5/5, 5/5 with resisted flexion and extension. Outside Record review: Office notes reviewed, x-rays reviewed. Patient has had steroid injection in the shoulder with mild relief but subsequent recurrence of his shoulder pain. He has also been doing exercises with the shoulder with increased pain. Imaging:   X-rays of the right shoulder reviewed which showed no acute fractures, no dislocations, no significant degenerative changes in the [facility] joint or the glenohumeral joint. Assessment:    Diagnosis Orders   1. Superior labrum anterior-to-posterior (SLAP) tear of right shoulder  FL SHOULDER ARTHROGRAM RIGHT S&I    MRI SHOULDER RIGHT WO CONTRAST   2. TFCC (triangular fibrocartilage complex) injury, right, initial encounter  FL SHOULDER ARTHROGRAM RIGHT S&I    MRI SHOULDER RIGHT WO CONTRAST         Plan:   I suspect he may have a small SLAP tear within the shoulder that is causing pain with certain motion. He does not feel like he has a rotator cuff tear at this point. Because he has been dealing with this shoulder issue for multiple years and has not had resolution of the problem we will get an MRI arthrogram of the shoulder to evaluate for possible labral tear. Patient Instructions   MRI arthrogram right shoulder to evaluate for labral tear  Follow-up after MRI to discuss results. If you feel like your wrist is causing more pain than I will get you a referral to hand surgeon. *Please note this report has been partially produced using speech recognition Dragon software and may contain errors related to that system including errors in grammar, punctuation, and spelling, as well as words and phrases that may be inappropriate.  If there are any questions or concerns please feel free to contact the dictating provider for clarification

## 2022-01-31 NOTE — PATIENT INSTRUCTIONS
MRI arthrogram right shoulder to evaluate for labral tear  Follow-up after MRI to discuss results. If you feel like your wrist is causing more pain than I will get you a referral to hand surgeon.

## 2022-02-01 ASSESSMENT — ENCOUNTER SYMPTOMS
EYES NEGATIVE: 1
RESPIRATORY NEGATIVE: 1
GASTROINTESTINAL NEGATIVE: 1

## 2022-02-11 ENCOUNTER — HOSPITAL ENCOUNTER (OUTPATIENT)
Dept: MRI IMAGING | Age: 27
Discharge: HOME | End: 2022-02-11
Payer: COMMERCIAL

## 2022-02-11 ENCOUNTER — HOSPITAL ENCOUNTER (OUTPATIENT)
Dept: GENERAL RADIOLOGY | Age: 27
Discharge: HOME | End: 2022-02-11
Payer: COMMERCIAL

## 2022-02-11 DIAGNOSIS — S43.431A SUPERIOR LABRUM ANTERIOR-TO-POSTERIOR (SLAP) TEAR OF RIGHT SHOULDER: ICD-10-CM

## 2022-02-11 DIAGNOSIS — S69.81XA TFCC (TRIANGULAR FIBROCARTILAGE COMPLEX) INJURY, RIGHT, INITIAL ENCOUNTER: ICD-10-CM

## 2022-02-11 PROCEDURE — 6360000004 HC RX CONTRAST MEDICATION: Performed by: PHYSICIAN ASSISTANT

## 2022-02-11 PROCEDURE — A9577 INJ MULTIHANCE: HCPCS | Performed by: PHYSICIAN ASSISTANT

## 2022-02-11 PROCEDURE — 73040 CONTRAST X-RAY OF SHOULDER: CPT

## 2022-02-11 PROCEDURE — 73221 MRI JOINT UPR EXTREM W/O DYE: CPT

## 2022-02-11 RX ADMIN — GADOBENATE DIMEGLUMINE 0.2 ML: 529 INJECTION, SOLUTION INTRAVENOUS at 12:34

## 2022-02-11 RX ADMIN — IOPAMIDOL 10 ML: 755 INJECTION, SOLUTION INTRAVENOUS at 12:33

## 2022-04-04 RX ORDER — AMLODIPINE BESYLATE 5 MG/1
TABLET ORAL
Qty: 30 TABLET | Refills: 11 | Status: SHIPPED | OUTPATIENT
Start: 2022-04-04 | End: 2022-08-15 | Stop reason: SDUPTHER

## 2022-06-26 SDOH — HEALTH STABILITY: PHYSICAL HEALTH: ON AVERAGE, HOW MANY MINUTES DO YOU ENGAGE IN EXERCISE AT THIS LEVEL?: 60 MIN

## 2022-06-26 SDOH — HEALTH STABILITY: PHYSICAL HEALTH: ON AVERAGE, HOW MANY DAYS PER WEEK DO YOU ENGAGE IN MODERATE TO STRENUOUS EXERCISE (LIKE A BRISK WALK)?: 4 DAYS

## 2022-06-27 ENCOUNTER — OFFICE VISIT (OUTPATIENT)
Dept: FAMILY MEDICINE CLINIC | Age: 27
End: 2022-06-27
Payer: COMMERCIAL

## 2022-06-27 VITALS
HEIGHT: 74 IN | BODY MASS INDEX: 27.11 KG/M2 | WEIGHT: 211.2 LBS | RESPIRATION RATE: 16 BRPM | TEMPERATURE: 96.9 F | SYSTOLIC BLOOD PRESSURE: 138 MMHG | HEART RATE: 77 BPM | OXYGEN SATURATION: 98 % | DIASTOLIC BLOOD PRESSURE: 80 MMHG

## 2022-06-27 DIAGNOSIS — I10 ESSENTIAL HYPERTENSION: ICD-10-CM

## 2022-06-27 DIAGNOSIS — Z00.00 ENCOUNTER FOR WELL ADULT EXAM WITHOUT ABNORMAL FINDINGS: Primary | ICD-10-CM

## 2022-06-27 PROBLEM — G47.33 OSA (OBSTRUCTIVE SLEEP APNEA): Status: RESOLVED | Noted: 2019-07-25 | Resolved: 2022-06-27

## 2022-06-27 PROBLEM — G47.19 EXCESSIVE DAYTIME SLEEPINESS: Status: RESOLVED | Noted: 2019-07-25 | Resolved: 2022-06-27

## 2022-06-27 PROCEDURE — 99385 PREV VISIT NEW AGE 18-39: CPT | Performed by: FAMILY MEDICINE

## 2022-06-27 ASSESSMENT — ENCOUNTER SYMPTOMS
ABDOMINAL PAIN: 0
SHORTNESS OF BREATH: 0
EYE PAIN: 0
CONSTIPATION: 0
SINUS PRESSURE: 0
VOMITING: 0
TROUBLE SWALLOWING: 0
COUGH: 0
DIARRHEA: 0
APNEA: 0
NAUSEA: 0
SORE THROAT: 0

## 2022-06-27 ASSESSMENT — PATIENT HEALTH QUESTIONNAIRE - PHQ9
SUM OF ALL RESPONSES TO PHQ QUESTIONS 1-9: 0
2. FEELING DOWN, DEPRESSED OR HOPELESS: 0
SUM OF ALL RESPONSES TO PHQ QUESTIONS 1-9: 0
1. LITTLE INTEREST OR PLEASURE IN DOING THINGS: 0
SUM OF ALL RESPONSES TO PHQ QUESTIONS 1-9: 0
SUM OF ALL RESPONSES TO PHQ9 QUESTIONS 1 & 2: 0
SUM OF ALL RESPONSES TO PHQ QUESTIONS 1-9: 0

## 2022-06-27 NOTE — ASSESSMENT & PLAN NOTE
Blood pressures well controlled with amlodipine 5 mg daily. It appears that a work-up for primary hyper aldosteronism or pheochromocytoma has been done.

## 2022-06-27 NOTE — PROGRESS NOTES
6/27/22    Jamie Franco  1995  28 y.o. male     Adult Annual Preventive Visit  Chief Complaint   Patient presents with    New Patient     adoption paperwork completion       Activity habits: Exercises 4-5 d/week for 1 hr. Eating habits: good. Sleep habits:  Some struggle but not extremely abnormal. Mostly adequately rested. Social support: Good    Mentation:   No or minimal trouble with memory and Learning new things    Review of Systems   Constitutional: Negative for fatigue and fever. HENT: Negative for nosebleeds, sinus pressure, sneezing, sore throat and trouble swallowing. Eyes: Negative for pain and visual disturbance. Respiratory: Negative for apnea, cough and shortness of breath. Cardiovascular: Negative for chest pain and leg swelling. Gastrointestinal: Negative for abdominal pain, constipation, diarrhea, nausea and vomiting. Endocrine: Negative for cold intolerance, heat intolerance and polyuria. Genitourinary: Negative for difficulty urinating and hematuria. Musculoskeletal: Negative for arthralgias and gait problem. Skin: Negative for rash and wound. Allergic/Immunologic: Negative for environmental allergies, food allergies and immunocompromised state. Neurological: Negative for speech difficulty, light-headedness and headaches. Hematological: Negative for adenopathy. Does not bruise/bleed easily. Psychiatric/Behavioral: Positive for sleep disturbance. Negative for decreased concentration, dysphoric mood, self-injury and suicidal ideas. The patient is not nervous/anxious. Fasting: No    No Known Allergies     Current Outpatient Medications   Medication Sig Dispense Refill    amLODIPine (NORVASC) 5 MG tablet TAKE ONE TABLET BY MOUTH DAILY 30 tablet 11     No current facility-administered medications for this visit.        OBJECTIVE    /80 (Site: Right Upper Arm, Position: Sitting, Cuff Size: Medium Adult)   Pulse 77   Temp 96.9 °F (36.1 °C) (Infrared) Resp 16   Ht 6' 2\" (1.88 m)   Wt 211 lb 3.2 oz (95.8 kg)   SpO2 98%   BMI 27.12 kg/m²     Physical Exam   Constitutional:       General: Not in acute distress. Appearance: Normal appearance. Not ill-appearing, toxic-appearing or diaphoretic. HENT:      Head: Normocephalic. Right Ear: Tympanic membrane, ear canal and external ear normal.      Left Ear: Tympanic membrane, ear canal and external ear normal.      Nose: No rhinorrhea. Mouth/Throat:      Mouth: Mucous membranes are moist.      Pharynx: Oropharynx is clear. No oropharyngeal exudate or posterior oropharyngeal erythema. Eyes:      General: No scleral icterus. Right eye: No discharge. Left eye: No discharge. Conjunctiva/sclera: Conjunctivae normal.   Neck:      Thyroid: No thyroid mass, thyromegaly or thyroid tenderness. Cardiovascular:      Rate and Rhythm: Normal rate and regular rhythm. Pulses:           Posterior tibial pulses are 2+ on the right side and 2+ on the left side. Heart sounds: Normal heart sounds. No murmur heard. No friction rub. No gallop. Pulmonary:      Effort: Pulmonary effort is normal. No respiratory distress. Breath sounds: Normal breath sounds. No stridor. No wheezing, rhonchi or rales. Abdominal:      General: There is no distension. Palpations: Abdomen is soft. Tenderness: There is no abdominal tenderness. There is no guarding. Musculoskeletal:         General: No deformity. Cervical back: No rigidity. Right lower leg: No edema. Left lower leg: No edema. Lymphadenopathy:      Cervical: No cervical adenopathy. Right upper body: No supraclavicular or axillary adenopathy. Left upper body: No supraclavicular or axillary adenopathy. Lower Body: No right inguinal adenopathy. No left inguinal adenopathy. Skin:     General: Skin is warm. Coloration: Skin is not jaundiced. Neurological:      Mental Status: She is alert. Deep Tendon Reflexes:      Reflex Scores:       Patellar reflexes are 2+ on the right side and 2+ on the left side. Psychiatric:         Attention and Perception: Attention and perception normal.         Mood and Affect: Mood normal.         Speech: Speech normal.         Behavior: Behavior normal.         Thought Content: Thought content normal.    ASSESSMENT AND PLAN    1. Encounter for well adult exam without abnormal findings  2. Essential hypertension  Assessment & Plan:   Blood pressures well controlled with amlodipine 5 mg daily. It appears that a work-up for primary hyper aldosteronism or pheochromocytoma has been done. Counseling provided for:  Healthy eating - Avoid sugar and other refined carbohydrates. and Eat more healthy unsaturated fats (runny at room temperature like oils and nut oils and fish oils and avocados). >> Exercise - 1> try to do 150 minutes a week of exercise that is as hard as walking briskly (30 minutes 5 days a week or 22 minutes every day); and 2> do some strength training 2 or 3 times a week. Social support - Keep socially involved. This will help with keeping your brain working well (avoiding dementia) as you get older and also help you to be happier. and Mentally activity - Keep trying to learn new things, reading, following sports/fashion/whatever you like, and other mental things to keep your brain working well and avoid dementia. Recommended smoking cessation. Remember to be thankful for the good things in life. and Make a list daily of 1-2 things you are thankful for. In addition to the annual preventive visit I filled out a form stating that he is in good health to be an adoptive parent. On the form I accidentally wrote my medical license number as [id number] and it should be [phone]. Return in about 1 year (around 6/27/2023) for [facility].      Matheus Redd MD

## 2022-06-27 NOTE — PATIENT INSTRUCTIONS
Advance Directives: Care Instructions  Overview  An advance directive is a legal way to state your wishes at the end of your life. It tells your family and your doctor what to do if you can't say what youwant. There are two main types of advance directives. You can change them any timeyour wishes change. Living will. This form tells your family and your doctor your wishes about life support and other treatment. The form is also called a declaration. Medical power of . This form lets you name a person to make treatment decisions for you when you can't speak for yourself. This person is called a health care agent (health care proxy, health care surrogate). The form is also called a durable power of  for health care. If you do not have an advance directive, decisions about your medical care maybe made by a family member, or by a doctor or a  who doesn't know you. It may help to think of an advance directive as a gift to the people who carefor you. If you have one, they won't have to make tough decisions by themselves. Follow-up care is a key part of your treatment and safety. Be sure to make and go to all appointments, and call your doctor if you are having problems. It's also a good idea to know your test results and keep alist of the medicines you take. What should you include in an advance directive? Many states have a unique advance directive form. (It may ask you to address specific issues.) Or you might use a universal form that's approved by manystates. If your form doesn't tell you what to address, it may be hard to know what to include in your advance directive. Use the questions below to help you getstarted.  Who do you want to make decisions about your medical care if you are not able to?  What life-support measures do you want if you have a serious illness that gets worse over time or can't be cured?  What are you most afraid of that might happen?  (Maybe you're afraid of having pain, losing your independence, or being kept alive by machines.)   Where would you prefer to die? (Your home? A hospital? A nursing home?)   Do you want to donate your organs when you die?  Do you want certain Jehovah's witness practices performed before you die? When should you call for help? Be sure to contact your doctor if you have any questions. Where can you learn more? Go to https://chpepiceweb.AutomateIt. org and sign in to your [employer] account. Enter R264 in the Linkyt box to learn more about \"Advance Directives: Care Instructions. \"     If you do not have an account, please click on the \"Sign Up Now\" link. Current as of: October 18, 2021               Content Version: 13.3  © 2006-2022 Healthwise, [employer]. Care instructions adapted under license by [facility]). If you have questions about a medical condition or this instruction, always ask your healthcare professional. [place] [address] any warranty or liability for your use of this information. Well Visit, Ages 25 to 48: Care Instructions  Overview     Well visits can help you stay healthy. Your doctor has checked your overall health and may have suggested ways to take good care of yourself. Your doctor also may have recommended tests. At home, you can help prevent illness withhealthy eating, regular exercise, and other steps. Follow-up care is a key part of your treatment and safety. Be sure to make and go to all appointments, and call your doctor if you are having problems. It's also a good idea to know your test results and keep alist of the medicines you take. How can you care for yourself at home?  Get screening tests that you and your doctor decide on. Screening helps find diseases before any symptoms appear.  Eat healthy foods. Choose fruits, vegetables, whole grains, protein, and low-fat dairy foods. Limit fat, especially saturated fat.  Reduce salt in your diet.   Limit alcohol. If you are a man, have no more than 2 drinks a day or 14 drinks a week. If you are a woman, have no more than 1 drink a day or 7 drinks a week.  Get at least 30 minutes of physical activity on most days of the week. Walking is a good choice. You also may want to do other activities, such as running, swimming, cycling, or playing tennis or team sports. Discuss any changes in your exercise program with your doctor.  Reach and stay at a healthy weight. This will lower your risk for many problems, such as obesity, diabetes, heart disease, and high blood pressure.  Do not smoke or allow others to smoke around you. If you need help quitting, talk to your doctor about stop-smoking programs and medicines. These can increase your chances of quitting for good.  Care for your mental health. It is easy to get weighed down by worry and stress. Learn strategies to manage stress, like deep breathing and mindfulness, and stay connected with your family and community. If you find you often feel sad or hopeless, talk with your doctor. Treatment can help.  Talk to your doctor about whether you have any risk factors for sexually transmitted infections (STIs). You can help prevent STIs if you wait to have sex with a new partner (or partners) until you've each been tested for STIs. It also helps if you use condoms (male or female condoms) and if you limit your sex partners to one person who only has sex with you. Vaccines are available for some STIs, such as HPV.  Use birth control if it's important to you to prevent pregnancy. Talk with your doctor about the choices available and what might be best for you.  If you think you may have a problem with alcohol or drug use, talk to your doctor. This includes prescription medicines (such as amphetamines and opioids) and illegal drugs (such as cocaine and methamphetamine).  Your doctor can help you figure out what type of treatment is best for you.   Protect your skin from too much sun. When you're outdoors from 10 a.m. to 4 p.m., stay in the shade or cover up with clothing and a hat with a wide brim. Wear sunglasses that block UV rays. Even when it's cloudy, put broad-spectrum sunscreen (SPF 30 or higher) on any exposed skin.  See a dentist one or two times a year for checkups and to have your teeth cleaned.  Wear a seat belt in the car. When should you call for help? Watch closely for changes in your health, and be sure to contact your doctor if you have any problems or symptoms that concern you. Where can you learn more? Go to https://[employer] TodaypeIngenicard Americaeweb.CrowdStrike. org and sign in to your [email or web address] account. Enter P072 in the Delivered box to learn more about \"Well Visit, Ages 25 to 48: Care Instructions. \"     If you do not have an account, please click on the \"Sign Up Now\" link. Current as of: October 6, 2021               Content Version: 13.3  © 2006-2022 Healthwise, Incorporated. Care instructions adapted under license by [facility] ([facility]). If you have questions about a medical condition or this instruction, always ask your healthcare professional. [address] any warranty or liability for your use of this information.

## 2022-08-15 ENCOUNTER — OFFICE VISIT (OUTPATIENT)
Dept: CARDIOLOGY CLINIC | Age: 27
End: 2022-08-15
Payer: COMMERCIAL

## 2022-08-15 VITALS
WEIGHT: 219.2 LBS | OXYGEN SATURATION: 99 % | DIASTOLIC BLOOD PRESSURE: 98 MMHG | BODY MASS INDEX: 28.13 KG/M2 | SYSTOLIC BLOOD PRESSURE: 218 MMHG | HEIGHT: 74 IN | HEART RATE: 68 BPM

## 2022-08-15 DIAGNOSIS — I10 UNCONTROLLED HYPERTENSION: ICD-10-CM

## 2022-08-15 DIAGNOSIS — I51.7 LVH (LEFT VENTRICULAR HYPERTROPHY): Primary | ICD-10-CM

## 2022-08-15 PROCEDURE — 4004F PT TOBACCO SCREEN RCVD TLK: CPT | Performed by: INTERNAL MEDICINE

## 2022-08-15 PROCEDURE — G8419 CALC BMI OUT NRM PARAM NOF/U: HCPCS | Performed by: INTERNAL MEDICINE

## 2022-08-15 PROCEDURE — 99213 OFFICE O/P EST LOW 20 MIN: CPT | Performed by: INTERNAL MEDICINE

## 2022-08-15 PROCEDURE — G8427 DOCREV CUR MEDS BY ELIG CLIN: HCPCS | Performed by: INTERNAL MEDICINE

## 2022-08-15 RX ORDER — AMLODIPINE BESYLATE 5 MG/1
10 TABLET ORAL DAILY
Qty: 90 TABLET | Refills: 3 | Status: SHIPPED | OUTPATIENT
Start: 2022-08-15

## 2022-08-15 NOTE — PROGRESS NOTES
Janel Ruiz MD        OFFICE  FOLLOWUP NOTE    Chief complaints: patient is here for management of HTN    Subjective: patient feels better, no chest pain, no shortness of breath, no dizziness, no palpitations    BRIAN Poole is a 28 y. o.year old who  has a past medical history of Diarrhea, H/O Doppler renal arterial ultrasound, H/O echocardiogram, Hypertension, and Lymphadenopathy. and presents for management of HTNwhich are well controlled      Current Outpatient Medications   Medication Sig Dispense Refill    amLODIPine (NORVASC) 5 MG tablet TAKE ONE TABLET BY MOUTH DAILY 30 tablet 11     No current facility-administered medications for this visit. Allergies: Patient has no known allergies. Past Medical History:   Diagnosis Date    Diarrhea     H/O Doppler renal arterial ultrasound 05/02/2019    Normal renal artery doppler. H/O echocardiogram 05/02/2019    EF 55-60%, Normal study.     Hypertension     Lymphadenopathy      Past Surgical History:   Procedure Laterality Date    VASCULAR SURGERY      varicose veins     Family History   Problem Relation Age of Onset    Clotting Disorder Mother     Arthritis Father     High Blood Pressure Father     High Cholesterol Father     Obesity Father     Substance Abuse Father     Early Death Paternal Uncle         mid to late 29's    Heart Attack Paternal Uncle     Heart Disease Paternal Uncle     Mental Illness Paternal Uncle     Arthritis Paternal Grandmother     High Cholesterol Paternal Grandmother     Alcohol Abuse Paternal Grandfather     Clotting Disorder Maternal Grandfather      Social History     Tobacco Use    Smoking status: Some Days     Types: Pipe    Smokeless tobacco: Never   Substance Use Topics    Alcohol use: Never     Comment: caffine occa      @ANN@  Review of Systems:   Constitutional: No Fever or Weight Loss   Eyes: No Decreased Vision  ENT: No Headaches, Hearing Loss or Vertigo  Cardiovascular: No chest pain, dyspnea on exertion, palpitations or loss of consciousness  Respiratory: No cough or wheezing    Gastrointestinal: No abdominal pain, appetite loss, blood in stools, constipation, diarrhea or heartburn  Genitourinary: No dysuria, trouble voiding, or hematuria  Musculoskeletal:  No gait disturbance, weakness or joint complaints  Integumentary: No rash or pruritis  Neurological: No TIA or stroke symptoms  Psychiatric: No anxiety or depression  Endocrine: No malaise, fatigue or temperature intolerance  Hematologic/Lymphatic: No bleeding problems, blood clots or swollen lymph nodes  Allergic/Immunologic: No nasal congestion or hives  All systems negative except as marked. Objective:  BP (!) 218/98 (Site: Left Upper Arm, Position: Sitting, Cuff Size: Large Adult)   Pulse 68   Ht 6' 2\" (1.88 m)   Wt 219 lb 3.2 oz (99.4 kg)   SpO2 99%   BMI 28.14 kg/m²   Wt Readings from Last 3 Encounters:   08/15/22 219 lb 3.2 oz (99.4 kg)   06/27/22 211 lb 3.2 oz (95.8 kg)   01/31/22 200 lb (90.7 kg)     Body mass index is 28.14 kg/m². GENERAL - Alert, oriented, pleasant, in no apparent distress,normal grooming  HEENT - pupils are intact, cornea intact, conjunctive normal color, ears are normal in exam,  Neck - Supple. No jugular venous distention noted. No carotid bruits, no apical -carotid delay  Respiratory:  Normal breath sounds, No respiratory distress, No wheezing, No chest tenderness. ,no use of accessory muscles, diaphragm movement is normal  Cardiovascular: (PMI) apex non displaced,no lifts no thrills, no s3,no s4, Normal heart rate, Normal rhythm, No murmurs, No rubs, No gallops.  Carotid arteries pulse and amplitude are normal no bruit, no abdominal bruit noted ( normal abdominal aorta ausculation),   Extremities - No cyanosis, clubbing, or significant edema, no varicose veins    Abdomen - No masses, tenderness, or organomegaly, no hepato-splenomegally, no bruits  Musculoskeletal - No significant edema, no kyphosis or scoliosis, no deformity in any extremity noted, muscle strength and tone are normal  Skin: no ulcer,no scar,no stasis dermatitis   Neurologic - alert oriented times 3,Cranial nerves II through XII are grossly intact. There were no gross focal neurologic abnormalities. Psychiatric: normal mood and affect    No results found for: CKTOTAL, CKMB, CKMBINDEX, TROPONINI  BNP:  No results found for: BNP  PT/INR:  No results found for: PTINR  No results found for: LABA1C  Lab Results   Component Value Date    CHOL 145 03/27/2019    TRIG 38 03/27/2019    HDL 53 03/27/2019    LDLCALC 84 03/27/2019     Lab Results   Component Value Date    ALT 30 03/27/2019    AST 25 03/27/2019     TSH:  No results found for: TSH    Impression:  Lazarus Dawes is a 28 y. o.year old who  has a past medical history of Diarrhea, H/O Doppler renal arterial ultrasound, H/O echocardiogram, Hypertension, and Lymphadenopathy. and presents with     Plan:  HTN/: uncontrolled with hypertensive urgency,recheck echo to r/o LVH, increase norvasc 10mg dailly labs for aldosterone, renin and metanephrines were normal, he wakes up 3 -12 times a night, his home sleep study was normal, however, he was recommend full study for sleep apnea,recommend to check daily blood pressure at home and bring the log of blood pressure,Avoid red meat, processed meat, and salt,start exercise, lose weight, increase fresh fruits, green vegetable and nuts, will give  lopressor 25mg one time here at office and recheck before discharge, recheck renal doppler, IF BLOOD pressure does not come down, will need to send hospital, case discussed with patient. Health maintenance: exerise and diet  All labs, medications and tests reviewed, continue all other medications of all above medical condition listed as is.     [unfilled]

## 2022-08-15 NOTE — PATIENT INSTRUCTIONS
Please be informed that if you contact our office outside of normal business hours the physician on call cannot help with any scheduling or rescheduling issues, procedure instruction questions or any type of medication issue. We advise you for any urgent/emergency that you go to the nearest emergency room! PLEASE CALL OUR OFFICE DURING NORMAL BUSINESS HOURS    Monday - Friday   8 am to 5 pm    [place]: [phone]    [place]:  [phone]    **It is YOUR responsibilty to bring medication bottles and/or updated medication list to [address] [place] [address]. This will allow us to better serve you and all your healthcare needs**    Please hold on to these instructions the  will call you within 1-9 business days when we receive authorization from your insurance. Echocardiogram    WHAT TO EXPECT:   This test will take approximately 45 minutes. It is an ultrasound of the heart. It can look at the valves and chambers inside the heart   There is no special instructions for this test.     If you are unable to keep this appointment, please notify us 24 hours prior to test at [phone].

## 2022-08-31 ENCOUNTER — PROCEDURE VISIT (OUTPATIENT)
Dept: CARDIOLOGY CLINIC | Age: 27
End: 2022-08-31
Payer: COMMERCIAL

## 2022-08-31 DIAGNOSIS — I51.7 LVH (LEFT VENTRICULAR HYPERTROPHY): ICD-10-CM

## 2022-08-31 LAB
LV EF: 58 %
LVEF MODALITY: NORMAL

## 2022-08-31 PROCEDURE — 93306 TTE W/DOPPLER COMPLETE: CPT | Performed by: INTERNAL MEDICINE

## 2022-09-08 ENCOUNTER — TELEPHONE (OUTPATIENT)
Dept: CARDIOLOGY CLINIC | Age: 27
End: 2022-09-08

## 2022-10-03 ENCOUNTER — OFFICE VISIT (OUTPATIENT)
Dept: CARDIOLOGY CLINIC | Age: 27
End: 2022-10-03
Payer: COMMERCIAL

## 2022-10-03 VITALS
SYSTOLIC BLOOD PRESSURE: 128 MMHG | HEIGHT: 74 IN | BODY MASS INDEX: 28.21 KG/M2 | OXYGEN SATURATION: 99 % | WEIGHT: 219.8 LBS | DIASTOLIC BLOOD PRESSURE: 70 MMHG | HEART RATE: 82 BPM

## 2022-10-03 DIAGNOSIS — I10 UNCONTROLLED HYPERTENSION: Primary | ICD-10-CM

## 2022-10-03 PROCEDURE — G8427 DOCREV CUR MEDS BY ELIG CLIN: HCPCS | Performed by: INTERNAL MEDICINE

## 2022-10-03 PROCEDURE — G8419 CALC BMI OUT NRM PARAM NOF/U: HCPCS | Performed by: INTERNAL MEDICINE

## 2022-10-03 PROCEDURE — 4004F PT TOBACCO SCREEN RCVD TLK: CPT | Performed by: INTERNAL MEDICINE

## 2022-10-03 PROCEDURE — 99213 OFFICE O/P EST LOW 20 MIN: CPT | Performed by: INTERNAL MEDICINE

## 2022-10-03 PROCEDURE — G8484 FLU IMMUNIZE NO ADMIN: HCPCS | Performed by: INTERNAL MEDICINE

## 2022-10-03 NOTE — PROGRESS NOTES
Joyce Loya MD        OFFICE  FOLLOWUP NOTE    Chief complaints: patient is here for management of HTN    Subjective: patient feels better, no chest pain, no shortness of breath, no dizziness, no palpitations    BRIAN Pascual is a 28 y. o.year old who  has a past medical history of Diarrhea, H/O Doppler renal arterial ultrasound, H/O echocardiogram, Hx of Doppler echocardiogram, Hypertension, and Lymphadenopathy. and presents for management o HTN which are well controlled      HIS RENAL DOPPLER WAS NORMAL, normal renal doppler, normal renin and aldosterone and metanephrines. Current Outpatient Medications   Medication Sig Dispense Refill    amLODIPine (NORVASC) 5 MG tablet Take 2 tablets by mouth in the morning. 90 tablet 3     No current facility-administered medications for this visit. Allergies: Patient has no known allergies. Past Medical History:   Diagnosis Date    Diarrhea     H/O Doppler renal arterial ultrasound 05/02/2019    Normal renal artery doppler. H/O echocardiogram 05/02/2019    EF 55-60%, Normal study. Hx of Doppler echocardiogram 08/31/2022    EF 55-60% Mild TR.     Hypertension     Lymphadenopathy      Past Surgical History:   Procedure Laterality Date    VASCULAR SURGERY      varicose veins     Family History   Problem Relation Age of Onset    Clotting Disorder Mother     Arthritis Father     High Blood Pressure Father     High Cholesterol Father     Obesity Father     Substance Abuse Father     Early Death Paternal Uncle         mid to late 29's    Heart Attack Paternal Uncle     Heart Disease Paternal Uncle     Mental Illness Paternal Uncle     Arthritis Paternal Grandmother     High Cholesterol Paternal Grandmother     Alcohol Abuse Paternal Grandfather     Clotting Disorder Maternal Grandfather      Social History     Tobacco Use    Smoking status: Some Days     Types: Pipe    Smokeless tobacco: Never   Substance Use Topics    Alcohol use: Never     Comment: caffine occa      @ANN@  Review of Systems:   Constitutional: No Fever or Weight Loss   Eyes: No Decreased Vision  ENT: No Headaches, Hearing Loss or Vertigo  Cardiovascular: No chest pain, dyspnea on exertion, palpitations or loss of consciousness  Respiratory: No cough or wheezing    Gastrointestinal: No abdominal pain, appetite loss, blood in stools, constipation, diarrhea or heartburn  Genitourinary: No dysuria, trouble voiding, or hematuria  Musculoskeletal:  No gait disturbance, weakness or joint complaints  Integumentary: No rash or pruritis  Neurological: No TIA or stroke symptoms  Psychiatric: No anxiety or depression  Endocrine: No malaise, fatigue or temperature intolerance  Hematologic/Lymphatic: No bleeding problems, blood clots or swollen lymph nodes  Allergic/Immunologic: No nasal congestion or hives  All systems negative except as marked. Objective:  /70 (Site: Left Upper Arm, Position: Sitting, Cuff Size: Large Adult)   Pulse 82   Ht 6' 2\" (1.88 m)   Wt 219 lb 12.8 oz (99.7 kg)   SpO2 99%   BMI 28.22 kg/m²   Wt Readings from Last 3 Encounters:   10/03/22 219 lb 12.8 oz (99.7 kg)   08/15/22 219 lb 3.2 oz (99.4 kg)   06/27/22 211 lb 3.2 oz (95.8 kg)     Body mass index is 28.22 kg/m². GENERAL - Alert, oriented, pleasant, in no apparent distress,normal grooming  HEENT - pupils are intact, cornea intact, conjunctive normal color, ears are normal in exam,  Neck - Supple. No jugular venous distention noted. No carotid bruits, no apical -carotid delay  Respiratory:  Normal breath sounds, No respiratory distress, No wheezing, No chest tenderness. ,no use of accessory muscles, diaphragm movement is normal  Cardiovascular: (PMI) apex non displaced,no lifts no thrills, no s3,no s4, Normal heart rate, Normal rhythm, No murmurs, No rubs, No gallops.  Carotid arteries pulse and amplitude are normal no bruit, no abdominal bruit noted ( normal abdominal aorta ausculation),   Extremities - No cyanosis, clubbing, or significant edema, no varicose veins    Abdomen - No masses, tenderness, or organomegaly, no hepato-splenomegally, no bruits  Musculoskeletal - No significant edema, no kyphosis or scoliosis, no deformity in any extremity noted, muscle strength and tone are normal  Skin: no ulcer,no scar,no stasis dermatitis   Neurologic - alert oriented times 3,Cranial nerves II through XII are grossly intact. There were no gross focal neurologic abnormalities. Psychiatric: normal mood and affect    No results found for: CKTOTAL, CKMB, CKMBINDEX, TROPONINI  BNP:  No results found for: BNP  PT/INR:  No results found for: PTINR  No results found for: LABA1C  Lab Results   Component Value Date    CHOL 145 03/27/2019    TRIG 38 03/27/2019    HDL 53 03/27/2019    LDLCALC 84 03/27/2019     Lab Results   Component Value Date    ALT 30 03/27/2019    AST 25 03/27/2019     TSH:  No results found for: TSH    Impression:  Deann Rivero is a 28 y. o.year old who  has a past medical history of Diarrhea, H/O Doppler renal arterial ultrasound, H/O echocardiogram, Hx of Doppler echocardiogram, Hypertension, and Lymphadenopathy. and presents with     Plan:  Htn: well controlled, he does wake up 4 times a night, he used to wake up 8 times in the night,   HIS RENAL DOPPLER WAS NORMAL, normal renal doppler, normal renin and aldosterone and metanephrines. continue norvasc 10mg daily, he may need sleep medications,  All labs, medications and tests reviewed, continue all other medications of all above medical condition listed as is.     @[facility]IT[facility]@

## 2022-10-03 NOTE — PATIENT INSTRUCTIONS
Please be informed that if you contact our office outside of normal business hours the physician on call cannot help with any scheduling or rescheduling issues, procedure instruction questions or any type of medication issue. We advise you for any urgent/emergency that you go to the nearest emergency room! PLEASE CALL OUR OFFICE DURING NORMAL BUSINESS HOURS    Monday - Friday   8 am to 5 pm    [place]: [place]: [phone]    [place]:  [phone]      **It is YOUR responsibilty to bring medication bottles and/or updated medication list to [address] [place] [address].  This will allow us to better serve you and all your healthcare needs**

## 2023-01-11 ENCOUNTER — OFFICE VISIT (OUTPATIENT)
Dept: FAMILY MEDICINE CLINIC | Age: 28
End: 2023-01-11
Payer: COMMERCIAL

## 2023-01-11 VITALS
TEMPERATURE: 96.7 F | HEART RATE: 69 BPM | BODY MASS INDEX: 28.39 KG/M2 | WEIGHT: 221.2 LBS | OXYGEN SATURATION: 98 % | DIASTOLIC BLOOD PRESSURE: 74 MMHG | HEIGHT: 74 IN | SYSTOLIC BLOOD PRESSURE: 130 MMHG

## 2023-01-11 DIAGNOSIS — I10 ESSENTIAL HYPERTENSION: Primary | ICD-10-CM

## 2023-01-11 DIAGNOSIS — Z02.9 ADMINISTRATIVE ENCOUNTER: ICD-10-CM

## 2023-01-11 PROCEDURE — 4004F PT TOBACCO SCREEN RCVD TLK: CPT | Performed by: FAMILY MEDICINE

## 2023-01-11 PROCEDURE — G8419 CALC BMI OUT NRM PARAM NOF/U: HCPCS | Performed by: FAMILY MEDICINE

## 2023-01-11 PROCEDURE — 3078F DIAST BP <80 MM HG: CPT | Performed by: FAMILY MEDICINE

## 2023-01-11 PROCEDURE — G8427 DOCREV CUR MEDS BY ELIG CLIN: HCPCS | Performed by: FAMILY MEDICINE

## 2023-01-11 PROCEDURE — 3075F SYST BP GE 130 - 139MM HG: CPT | Performed by: FAMILY MEDICINE

## 2023-01-11 PROCEDURE — 99213 OFFICE O/P EST LOW 20 MIN: CPT | Performed by: FAMILY MEDICINE

## 2023-01-11 PROCEDURE — G8484 FLU IMMUNIZE NO ADMIN: HCPCS | Performed by: FAMILY MEDICINE

## 2023-01-11 ASSESSMENT — ENCOUNTER SYMPTOMS: APNEA: 0

## 2023-01-11 NOTE — PROGRESS NOTES
1/11/23    Isa Tiwari  1995    SUBJECTIVE    HPI - Erminh Hsieh is a 28 y.o. male who presents today for evaluation of:  Chief Complaint   Patient presents with    Other     Wellness visit for adoption agency    He needs a form filled out every 6 months for the process of adopting a child. HTN : BP is well controlled on just 5mg amlodipine. Bad sleep patterns - frequent wakening. Sleeps about 6 hours a night and feels adequately rested. Review of Systems   Respiratory:  Negative for apnea. No Known Allergies     OBJECTIVE    /74 (Site: Right Upper Arm, Position: Sitting, Cuff Size: Large Adult)   Pulse 69   Temp (!) 96.7 °F (35.9 °C) (Infrared)   Ht 6' 2\" (1.88 m)   Wt 221 lb 3.2 oz (100.3 kg)   SpO2 98%   BMI 28.40 kg/m²     Physical Exam   Constitutional:       General: Not in acute distress. Appearance: Normal appearance. Not ill-appearing. Eyes:      General: No scleral icterus. Cardiovascular:      Rate and Rhythm: Normal rate and regular rhythm. Heart sounds: No murmur heard. No friction rub. No gallop. Pulmonary:      Effort: Pulmonary effort is normal. No respiratory distress. Breath sounds: No wheezing, rhonchi or rales. Abdominal:      Palpations: Abdomen is soft. There is no mass. Tenderness: There is no abdominal tenderness. Musculoskeletal:     Moves all extremities normally. Skin:     General: Skin is warm. Coloration: Skin is not jaundiced. Neurological:      Mental Status: Patient is alert. Psychiatric:         Behavior: Behavior normal.         Thought Content: Thought content normal.         Judgment: Judgment normal.    Reviewed labs from 5/19/19 including normal metanephrines, normal aldosterone, normal plasma renal and activity and normal ratio of weaning activity and aldosterone. ASSESSMENT/PLAN:    1. HTN, Essential hypertension  Assessment & Plan:   Plan to continue amlodipine at 5 mg daily. He monitors it himself.   I encouraged him to try to get a bit more sleep since 8 hours of sleep is likely to improve his overall health and possibly his blood pressure as well. His cardiologist has ruled out primary hyper per aldosteronism and pheochromocytoma. 2. Administrative encounter    I filled out a form for him and it should be scanned into the record for adopting a child. Counseling provided for:  Healthy eating - Avoid sugar and other refined carbohydrates. and Eat more foods with fiber like vegetables and whole grain products.   >> Exercise - 1> try to do 150 minutes a week of exercise that is as hard as walking briskly (30 minutes 5 days a week or 22 minutes every day); and 2> do some strength training 2 or 3 times a week. Return in about 6 months (around 7/11/2023) for [facility] [place].    Baltazar Montaño MD

## 2023-01-11 NOTE — ASSESSMENT & PLAN NOTE
Plan to continue amlodipine at 5 mg daily. He monitors it himself. I encouraged him to try to get a bit more sleep since 8 hours of sleep is likely to improve his overall health and possibly his blood pressure as well. His cardiologist has ruled out primary hyper per aldosteronism and pheochromocytoma.

## 2023-03-20 ENCOUNTER — OFFICE VISIT (OUTPATIENT)
Dept: CARDIOLOGY CLINIC | Age: 28
End: 2023-03-20
Payer: COMMERCIAL

## 2023-03-20 VITALS
HEIGHT: 74 IN | SYSTOLIC BLOOD PRESSURE: 132 MMHG | WEIGHT: 216.8 LBS | BODY MASS INDEX: 27.82 KG/M2 | DIASTOLIC BLOOD PRESSURE: 82 MMHG

## 2023-03-20 DIAGNOSIS — I10 ESSENTIAL HYPERTENSION: Primary | ICD-10-CM

## 2023-03-20 PROCEDURE — G8427 DOCREV CUR MEDS BY ELIG CLIN: HCPCS | Performed by: NURSE PRACTITIONER

## 2023-03-20 PROCEDURE — G8484 FLU IMMUNIZE NO ADMIN: HCPCS | Performed by: NURSE PRACTITIONER

## 2023-03-20 PROCEDURE — G8419 CALC BMI OUT NRM PARAM NOF/U: HCPCS | Performed by: NURSE PRACTITIONER

## 2023-03-20 PROCEDURE — 3075F SYST BP GE 130 - 139MM HG: CPT | Performed by: NURSE PRACTITIONER

## 2023-03-20 PROCEDURE — 99213 OFFICE O/P EST LOW 20 MIN: CPT | Performed by: NURSE PRACTITIONER

## 2023-03-20 PROCEDURE — 3079F DIAST BP 80-89 MM HG: CPT | Performed by: NURSE PRACTITIONER

## 2023-03-20 PROCEDURE — 4004F PT TOBACCO SCREEN RCVD TLK: CPT | Performed by: NURSE PRACTITIONER

## 2023-03-20 NOTE — PROGRESS NOTES
Work-up for aldosteronism and pheochromocytoma negative. Continue with norvasc 5 mg daily. Recent Echo did not show any diastolic dysfunction. Lipid panel normal in 2019. Patient monitors B/P home. Continue with diet and exercise. Patient seen, interviewed and examined. Testing was reviewed. Patient is encouraged to exercise even a brisk walk for 30 minutes at least 3 to 4 times a week. Lifestyle and risk factor modificatons discussed. Various goals are discussed and questions answered. Continue current medications. Appropriate prescriptions are addressed. Questions answered and patient verbalizes understanding. Call for any problems, questions, or concerns. Pt is to follow up in 12 months for Cardiac management    Electronically signed by Lolis Zimmerman.  ADRIEN Lynch CNP on 3/20/2023 at 2:47 PM

## 2023-03-20 NOTE — PATIENT INSTRUCTIONS
**It is YOUR responsibilty to bring medication bottles and/or updated medication list to [address] [place] [address]. This will allow us to better serve you and all your healthcare needs**  Thank you for allowing us to care for you today! We want to ensure we can follow your treatment plan and we strive to give you the best outcomes and experience possible. If you ever have a life threatening emergency and call 911 - for an ambulance (EMS)   Our providers can only care for you at:   [facility] or [facility]. Even if you have someone take you or you drive yourself we can only care for you in a [facility] facility. Our providers are not setup at the other healthcare locations!

## 2023-03-20 NOTE — ASSESSMENT & PLAN NOTE
- Normal renal ultrasound. Work-up for aldosteronism and pheochromocytoma negative. Continue with norvasc 5 mg daily. Echo in 2019 did not show any diastolic dysfunction.

## 2023-09-11 RX ORDER — AMLODIPINE BESYLATE 5 MG/1
5 TABLET ORAL DAILY
Qty: 90 TABLET | Refills: 3 | Status: SHIPPED | OUTPATIENT
Start: 2023-09-11

## 2023-11-07 ENCOUNTER — OFFICE VISIT (OUTPATIENT)
Dept: FAMILY MEDICINE CLINIC | Age: 28
End: 2023-11-07
Payer: COMMERCIAL

## 2023-11-07 VITALS
DIASTOLIC BLOOD PRESSURE: 82 MMHG | SYSTOLIC BLOOD PRESSURE: 130 MMHG | HEIGHT: 73 IN | TEMPERATURE: 97.7 F | WEIGHT: 212.8 LBS | HEART RATE: 70 BPM | OXYGEN SATURATION: 99 % | BODY MASS INDEX: 28.2 KG/M2

## 2023-11-07 DIAGNOSIS — Z11.59 SCREENING FOR VIRAL DISEASE: ICD-10-CM

## 2023-11-07 DIAGNOSIS — Z00.00 ENCOUNTER FOR WELL ADULT EXAM WITHOUT ABNORMAL FINDINGS: Primary | ICD-10-CM

## 2023-11-07 DIAGNOSIS — I10 ESSENTIAL HYPERTENSION: ICD-10-CM

## 2023-11-07 PROCEDURE — 3075F SYST BP GE 130 - 139MM HG: CPT | Performed by: FAMILY MEDICINE

## 2023-11-07 PROCEDURE — 99395 PREV VISIT EST AGE 18-39: CPT | Performed by: FAMILY MEDICINE

## 2023-11-07 PROCEDURE — G8484 FLU IMMUNIZE NO ADMIN: HCPCS | Performed by: FAMILY MEDICINE

## 2023-11-07 PROCEDURE — 3079F DIAST BP 80-89 MM HG: CPT | Performed by: FAMILY MEDICINE

## 2023-11-07 PROCEDURE — 36415 COLL VENOUS BLD VENIPUNCTURE: CPT | Performed by: FAMILY MEDICINE

## 2023-11-07 RX ORDER — AMLODIPINE BESYLATE 5 MG/1
5 TABLET ORAL DAILY
Qty: 90 TABLET | Refills: 3 | Status: SHIPPED | OUTPATIENT
Start: 2023-11-07

## 2023-11-07 SDOH — ECONOMIC STABILITY: FOOD INSECURITY: WITHIN THE PAST 12 MONTHS, THE FOOD YOU BOUGHT JUST DIDN'T LAST AND YOU DIDN'T HAVE MONEY TO GET MORE.: NEVER TRUE

## 2023-11-07 SDOH — ECONOMIC STABILITY: INCOME INSECURITY: HOW HARD IS IT FOR YOU TO PAY FOR THE VERY BASICS LIKE FOOD, HOUSING, MEDICAL CARE, AND HEATING?: NOT HARD AT ALL

## 2023-11-07 SDOH — ECONOMIC STABILITY: HOUSING INSECURITY
IN THE LAST 12 MONTHS, WAS THERE A TIME WHEN YOU DID NOT HAVE A STEADY PLACE TO SLEEP OR SLEPT IN A SHELTER (INCLUDING NOW)?: NO

## 2023-11-07 SDOH — ECONOMIC STABILITY: FOOD INSECURITY: WITHIN THE PAST 12 MONTHS, YOU WORRIED THAT YOUR FOOD WOULD RUN OUT BEFORE YOU GOT MONEY TO BUY MORE.: NEVER TRUE

## 2023-11-07 ASSESSMENT — ENCOUNTER SYMPTOMS
APNEA: 0
DIARRHEA: 0
BACK PAIN: 0
COUGH: 0
CONSTIPATION: 0
EYE PAIN: 0
TROUBLE SWALLOWING: 0
ABDOMINAL PAIN: 0
SHORTNESS OF BREATH: 0
VOMITING: 0
NAUSEA: 0

## 2023-11-07 ASSESSMENT — PATIENT HEALTH QUESTIONNAIRE - PHQ9
2. FEELING DOWN, DEPRESSED OR HOPELESS: 0
2. FEELING DOWN, DEPRESSED OR HOPELESS: NOT AT ALL
SUM OF ALL RESPONSES TO PHQ9 QUESTIONS 1 & 2: 0
SUM OF ALL RESPONSES TO PHQ QUESTIONS 1-9: 0
1. LITTLE INTEREST OR PLEASURE IN DOING THINGS: NOT AT ALL
1. LITTLE INTEREST OR PLEASURE IN DOING THINGS: 0
SUM OF ALL RESPONSES TO PHQ QUESTIONS 1-9: 0
SUM OF ALL RESPONSES TO PHQ QUESTIONS 1-9: 0
SUM OF ALL RESPONSES TO PHQ9 QUESTIONS 1 & 2: 0
SUM OF ALL RESPONSES TO PHQ QUESTIONS 1-9: 0

## 2023-11-07 NOTE — ASSESSMENT & PLAN NOTE
Stable and well-controlled. Recommended getting a CMP done at his convenience as well as getting a lipid panel at his convenience. Continue amlodipine 5 mg daily.

## 2023-11-30 ENCOUNTER — NURSE ONLY (OUTPATIENT)
Dept: FAMILY MEDICINE CLINIC | Age: 28
End: 2023-11-30
Payer: COMMERCIAL

## 2023-11-30 DIAGNOSIS — I10 ESSENTIAL HYPERTENSION: Primary | ICD-10-CM

## 2023-11-30 PROCEDURE — 36415 COLL VENOUS BLD VENIPUNCTURE: CPT | Performed by: FAMILY MEDICINE

## 2023-12-01 LAB
ALBUMIN SERPL-MCNC: 4.7 G/DL (ref 3.4–5)
ALBUMIN/GLOB SERPL: 2 {RATIO} (ref 1.1–2.2)
ALP SERPL-CCNC: 71 U/L (ref 40–129)
ALT SERPL-CCNC: 29 U/L (ref 10–40)
ANION GAP SERPL CALCULATED.3IONS-SCNC: 10 MMOL/L (ref 3–16)
AST SERPL-CCNC: 18 U/L (ref 15–37)
BILIRUB SERPL-MCNC: 0.3 MG/DL (ref 0–1)
BUN SERPL-MCNC: 18 MG/DL (ref 7–20)
CALCIUM SERPL-MCNC: 9.9 MG/DL (ref 8.3–10.6)
CHLORIDE SERPL-SCNC: 101 MMOL/L (ref 99–110)
CHOLEST SERPL-MCNC: 174 MG/DL (ref 0–199)
CO2 SERPL-SCNC: 26 MMOL/L (ref 21–32)
CREAT SERPL-MCNC: 1.1 MG/DL (ref 0.9–1.3)
GFR SERPLBLD CREATININE-BSD FMLA CKD-EPI: >60 ML/MIN/{1.73_M2}
GLUCOSE SERPL-MCNC: 95 MG/DL (ref 70–99)
HCV AB SERPL QL IA: NORMAL
HDLC SERPL-MCNC: 49 MG/DL (ref 40–60)
LDLC SERPL CALC-MCNC: 105 MG/DL
POTASSIUM SERPL-SCNC: 4.3 MMOL/L (ref 3.5–5.1)
PROT SERPL-MCNC: 7.1 G/DL (ref 6.4–8.2)
SODIUM SERPL-SCNC: 137 MMOL/L (ref 136–145)
TRIGL SERPL-MCNC: 101 MG/DL (ref 0–150)
VLDLC SERPL CALC-MCNC: 20 MG/DL

## 2024-03-21 ENCOUNTER — OFFICE VISIT (OUTPATIENT)
Dept: CARDIOLOGY CLINIC | Age: 29
End: 2024-03-21
Payer: COMMERCIAL

## 2024-03-21 VITALS
WEIGHT: 218.8 LBS | HEART RATE: 68 BPM | SYSTOLIC BLOOD PRESSURE: 120 MMHG | HEIGHT: 74 IN | DIASTOLIC BLOOD PRESSURE: 72 MMHG | OXYGEN SATURATION: 98 % | BODY MASS INDEX: 28.08 KG/M2

## 2024-03-21 DIAGNOSIS — I10 UNCONTROLLED HYPERTENSION: ICD-10-CM

## 2024-03-21 DIAGNOSIS — I10 ESSENTIAL HYPERTENSION: Primary | ICD-10-CM

## 2024-03-21 DIAGNOSIS — I51.7 LVH (LEFT VENTRICULAR HYPERTROPHY): ICD-10-CM

## 2024-03-21 PROCEDURE — 3078F DIAST BP <80 MM HG: CPT | Performed by: INTERNAL MEDICINE

## 2024-03-21 PROCEDURE — 99213 OFFICE O/P EST LOW 20 MIN: CPT | Performed by: INTERNAL MEDICINE

## 2024-03-21 PROCEDURE — 4004F PT TOBACCO SCREEN RCVD TLK: CPT | Performed by: INTERNAL MEDICINE

## 2024-03-21 PROCEDURE — 3074F SYST BP LT 130 MM HG: CPT | Performed by: INTERNAL MEDICINE

## 2024-03-21 PROCEDURE — G8419 CALC BMI OUT NRM PARAM NOF/U: HCPCS | Performed by: INTERNAL MEDICINE

## 2024-03-21 PROCEDURE — G8484 FLU IMMUNIZE NO ADMIN: HCPCS | Performed by: INTERNAL MEDICINE

## 2024-03-21 PROCEDURE — G8427 DOCREV CUR MEDS BY ELIG CLIN: HCPCS | Performed by: INTERNAL MEDICINE

## 2024-03-21 NOTE — PROGRESS NOTES
Young Contreras MD        OFFICE  FOLLOWUP NOTE    Chief complaints: patient is here for management of  HTN   Subjective: patient feels better, no chest pain, no shortness of breath, no dizziness, no palpitations    HPI Dario is a 28 y.o.year old who  has a past medical history of Diarrhea, H/O Doppler renal arterial ultrasound, H/O echocardiogram, Hx of Doppler echocardiogram, Hypertension, and Lymphadenopathy. and presents for management of HTN  which are well controlled      Current Outpatient Medications   Medication Sig Dispense Refill    amLODIPine (NORVASC) 5 MG tablet Take 1 tablet by mouth daily Once daily 90 tablet 3     No current facility-administered medications for this visit.     Allergies: Patient has no known allergies.  Past Medical History:   Diagnosis Date    Diarrhea     H/O Doppler renal arterial ultrasound 05/02/2019    Normal renal artery doppler.    H/O echocardiogram 05/02/2019    EF 55-60%, Normal study.    Hx of Doppler echocardiogram 08/31/2022    EF 55-60% Mild TR.    Hypertension     Lymphadenopathy      Past Surgical History:   Procedure Laterality Date    VASCULAR SURGERY      varicose veins     Family History   Problem Relation Age of Onset    Clotting Disorder Mother     Arthritis Father     High Blood Pressure Father     High Cholesterol Father     Obesity Father     Substance Abuse Father     Hypertension Brother     Clotting Disorder Maternal Grandfather     Arthritis Paternal Grandmother     High Cholesterol Paternal Grandmother     Alcohol Abuse Paternal Grandfather     Early Death Paternal Uncle         mid to late 30's    Heart Attack Paternal Uncle     Heart Disease Paternal Uncle     Mental Illness Paternal Uncle      Social History     Tobacco Use    Smoking status: Light Smoker     Types: Pipe, Cigars    Smokeless tobacco: Never    Tobacco comments:     Once a week during summer, never in winter.   Substance Use Topics    Alcohol use: Never     Comment:

## 2024-03-21 NOTE — PATIENT INSTRUCTIONS
**It is YOUR responsibilty to bring medication bottles and/or updated medication list to EACH APPOINTMENT. This will allow us to better serve you and all your healthcare needs**   Thank you for allowing us to care for you today!   We want to ensure we can follow your treatment plan and we strive to give you the best outcomes and experience possible.   If you ever have a life threatening emergency and call 911 - for an ambulance (EMS)   Our providers can only care for you at:   [facility] or [facility].   Even if you have someone take you or you drive yourself we can only care for you in a [facility]. Our providers are not setup at the other healthcare locations!   Please be informed that if you contact our office outside of normal business hours the physician on call cannot help with any scheduling or rescheduling issues, procedure instruction questions or any type of medication issue.    We advise you for any urgent/emergency that you go to the nearest emergency room!    PLEASE CALL OUR OFFICE DURING NORMAL BUSINESS HOURS    Monday - Friday   8 am to 5 pm    [place]: [phone]    [place]: [phone]    [place]:  [phone]  We are committed to providing you the best care possible.    If you receive a survey after visiting one of our offices, please take time to share your experience concerning your physician office visit.  These surveys are confidential and no health information about you is shared.    We are eager to improve for you and we are counting on your feedback to help make that happen.

## 2024-08-16 ENCOUNTER — OFFICE VISIT (OUTPATIENT)
Dept: FAMILY MEDICINE CLINIC | Age: 29
End: 2024-08-16
Payer: COMMERCIAL

## 2024-08-16 VITALS
WEIGHT: 215 LBS | SYSTOLIC BLOOD PRESSURE: 124 MMHG | BODY MASS INDEX: 27.6 KG/M2 | HEART RATE: 57 BPM | OXYGEN SATURATION: 99 % | DIASTOLIC BLOOD PRESSURE: 78 MMHG

## 2024-08-16 DIAGNOSIS — Z02.9 ADMINISTRATIVE ENCOUNTER: Primary | ICD-10-CM

## 2024-08-16 DIAGNOSIS — I10 ESSENTIAL HYPERTENSION: ICD-10-CM

## 2024-08-16 PROCEDURE — 4004F PT TOBACCO SCREEN RCVD TLK: CPT | Performed by: FAMILY MEDICINE

## 2024-08-16 PROCEDURE — 3074F SYST BP LT 130 MM HG: CPT | Performed by: FAMILY MEDICINE

## 2024-08-16 PROCEDURE — G8427 DOCREV CUR MEDS BY ELIG CLIN: HCPCS | Performed by: FAMILY MEDICINE

## 2024-08-16 PROCEDURE — G8419 CALC BMI OUT NRM PARAM NOF/U: HCPCS | Performed by: FAMILY MEDICINE

## 2024-08-16 PROCEDURE — 99213 OFFICE O/P EST LOW 20 MIN: CPT | Performed by: FAMILY MEDICINE

## 2024-08-16 PROCEDURE — 3078F DIAST BP <80 MM HG: CPT | Performed by: FAMILY MEDICINE

## 2024-08-16 RX ORDER — AMLODIPINE BESYLATE 5 MG/1
5 TABLET ORAL DAILY
Qty: 90 TABLET | Refills: 2 | Status: SHIPPED | OUTPATIENT
Start: 2024-08-16

## 2024-08-16 ASSESSMENT — PATIENT HEALTH QUESTIONNAIRE - PHQ9
2. FEELING DOWN, DEPRESSED OR HOPELESS: NOT AT ALL
SUM OF ALL RESPONSES TO PHQ QUESTIONS 1-9: 0
SUM OF ALL RESPONSES TO PHQ9 QUESTIONS 1 & 2: 0
1. LITTLE INTEREST OR PLEASURE IN DOING THINGS: NOT AT ALL
SUM OF ALL RESPONSES TO PHQ QUESTIONS 1-9: 0
SUM OF ALL RESPONSES TO PHQ QUESTIONS 1-9: 0
1. LITTLE INTEREST OR PLEASURE IN DOING THINGS: NOT AT ALL
SUM OF ALL RESPONSES TO PHQ QUESTIONS 1-9: 0
2. FEELING DOWN, DEPRESSED OR HOPELESS: NOT AT ALL
SUM OF ALL RESPONSES TO PHQ9 QUESTIONS 1 & 2: 0

## 2024-08-16 ASSESSMENT — ENCOUNTER SYMPTOMS
CONSTIPATION: 0
ABDOMINAL DISTENTION: 0
RECTAL PAIN: 0
COUGH: 0
SHORTNESS OF BREATH: 0
WHEEZING: 0
DIARRHEA: 0
VOMITING: 0
NAUSEA: 0

## 2024-08-16 NOTE — PROGRESS NOTES
8/16/24    Dario Coombs  1995    SUBJECTIVE    HPI - Dario is a 29 y.o. male who presents today for evaluation of:  Chief Complaint   Patient presents with    Paperwork     Adoption paperwork      He is in the process of seeking adoption and needs some form filled out.    Review of Systems   Constitutional:  Negative for fatigue, fever and unexpected weight change.   Respiratory:  Negative for cough, shortness of breath and wheezing.    Cardiovascular:  Negative for chest pain, palpitations and leg swelling.   Gastrointestinal:  Negative for abdominal distention, constipation, diarrhea, nausea, rectal pain and vomiting.   Endocrine: Negative for cold intolerance and heat intolerance.   Genitourinary:  Negative for difficulty urinating and hematuria.   Neurological:  Negative for dizziness, tremors, seizures, syncope, speech difficulty, weakness, light-headedness, numbness and headaches.   Psychiatric/Behavioral:  Negative for decreased concentration, dysphoric mood, self-injury and suicidal ideas. The patient is not nervous/anxious.          No Known Allergies     OBJECTIVE    /78 (Site: Left Upper Arm, Position: Sitting, Cuff Size: Large Adult)   Pulse 57   Wt 97.5 kg (215 lb)   SpO2 99%   BMI 27.60 kg/m²     Physical Exam   Constitutional:       General: Not in acute distress.     Appearance: Normal appearance. Not ill-appearing.   Eyes:      General: No scleral icterus.  Lymphadenopathy:      Cervical: No cervical adenopathy.      Upper Body:      Right upper body: No supraclavicular or axillary adenopathy.      Left upper body: No supraclavicular or axillary adenopathy.      Lower Body: No right inguinal adenopathy. No left inguinal adenopathy.   Cardiovascular:      Rate and Rhythm: Normal rate and regular rhythm.      Heart sounds: No murmur heard.   No friction rub. No gallop.    Pulmonary:      Effort: Pulmonary effort is normal. No respiratory distress.      Breath sounds: No wheezing, rhonchi or

## 2024-08-16 NOTE — ASSESSMENT & PLAN NOTE
Stable and very well-controlled.  In the past he has been worked up with renin activity, aldosterone, metanephrines and these results were normal.  Blood pressure is well-controlled with Norvasc 5 mg.  Plan to continue it.

## 2024-09-25 ENCOUNTER — OFFICE VISIT (OUTPATIENT)
Dept: ORTHOPEDIC SURGERY | Age: 29
End: 2024-09-25
Payer: COMMERCIAL

## 2024-09-25 VITALS
BODY MASS INDEX: 27.59 KG/M2 | WEIGHT: 215 LBS | HEIGHT: 74 IN | RESPIRATION RATE: 14 BRPM | HEART RATE: 62 BPM | OXYGEN SATURATION: 97 %

## 2024-09-25 DIAGNOSIS — M77.01 MEDIAL EPICONDYLITIS OF ELBOW, RIGHT: ICD-10-CM

## 2024-09-25 DIAGNOSIS — M77.11 LATERAL EPICONDYLITIS OF RIGHT ELBOW: Primary | ICD-10-CM

## 2024-09-25 PROCEDURE — G8427 DOCREV CUR MEDS BY ELIG CLIN: HCPCS | Performed by: PHYSICIAN ASSISTANT

## 2024-09-25 PROCEDURE — 99214 OFFICE O/P EST MOD 30 MIN: CPT | Performed by: PHYSICIAN ASSISTANT

## 2024-09-25 PROCEDURE — 4004F PT TOBACCO SCREEN RCVD TLK: CPT | Performed by: PHYSICIAN ASSISTANT

## 2024-09-25 PROCEDURE — G8419 CALC BMI OUT NRM PARAM NOF/U: HCPCS | Performed by: PHYSICIAN ASSISTANT

## 2024-09-25 ASSESSMENT — ENCOUNTER SYMPTOMS
GASTROINTESTINAL NEGATIVE: 1
RESPIRATORY NEGATIVE: 1
EYES NEGATIVE: 1

## 2024-12-03 DIAGNOSIS — I10 ESSENTIAL HYPERTENSION: ICD-10-CM

## 2024-12-04 RX ORDER — AMLODIPINE BESYLATE 5 MG/1
5 TABLET ORAL DAILY
Qty: 90 TABLET | Refills: 2 | OUTPATIENT
Start: 2024-12-04

## 2025-07-23 ASSESSMENT — PATIENT HEALTH QUESTIONNAIRE - PHQ9
SUM OF ALL RESPONSES TO PHQ QUESTIONS 1-9: 0
SUM OF ALL RESPONSES TO PHQ9 QUESTIONS 1 & 2: 0
2. FEELING DOWN, DEPRESSED OR HOPELESS: NOT AT ALL
SUM OF ALL RESPONSES TO PHQ QUESTIONS 1-9: 0
1. LITTLE INTEREST OR PLEASURE IN DOING THINGS: NOT AT ALL
SUM OF ALL RESPONSES TO PHQ QUESTIONS 1-9: 0
2. FEELING DOWN, DEPRESSED OR HOPELESS: NOT AT ALL
SUM OF ALL RESPONSES TO PHQ QUESTIONS 1-9: 0
1. LITTLE INTEREST OR PLEASURE IN DOING THINGS: NOT AT ALL

## 2025-07-24 ENCOUNTER — OFFICE VISIT (OUTPATIENT)
Dept: FAMILY MEDICINE CLINIC | Age: 30
End: 2025-07-24
Payer: COMMERCIAL

## 2025-07-24 VITALS
BODY MASS INDEX: 27.4 KG/M2 | TEMPERATURE: 97.2 F | OXYGEN SATURATION: 98 % | HEART RATE: 81 BPM | SYSTOLIC BLOOD PRESSURE: 124 MMHG | DIASTOLIC BLOOD PRESSURE: 84 MMHG | WEIGHT: 213.4 LBS

## 2025-07-24 DIAGNOSIS — Z00.00 ENCOUNTER FOR WELL ADULT EXAM WITHOUT ABNORMAL FINDINGS: Primary | ICD-10-CM

## 2025-07-24 DIAGNOSIS — I10 ESSENTIAL HYPERTENSION: ICD-10-CM

## 2025-07-24 DIAGNOSIS — Z23 IMMUNIZATION DUE: ICD-10-CM

## 2025-07-24 DIAGNOSIS — Z29.89 NEED FOR MALARIA PROPHYLAXIS: ICD-10-CM

## 2025-07-24 PROCEDURE — 3074F SYST BP LT 130 MM HG: CPT | Performed by: FAMILY MEDICINE

## 2025-07-24 PROCEDURE — 3079F DIAST BP 80-89 MM HG: CPT | Performed by: FAMILY MEDICINE

## 2025-07-24 PROCEDURE — 99395 PREV VISIT EST AGE 18-39: CPT | Performed by: FAMILY MEDICINE

## 2025-07-24 RX ORDER — ATOVAQUONE AND PROGUANIL HYDROCHLORIDE 250; 100 MG/1; MG/1
1 TABLET, FILM COATED ORAL DAILY
Qty: 21 TABLET | Refills: 0 | Status: SHIPPED | OUTPATIENT
Start: 2025-07-24

## 2025-07-24 RX ORDER — AMLODIPINE BESYLATE 5 MG/1
5 TABLET ORAL DAILY
Qty: 90 TABLET | Refills: 3 | Status: SHIPPED | OUTPATIENT
Start: 2025-07-24

## 2025-07-24 ASSESSMENT — ENCOUNTER SYMPTOMS
VOMITING: 0
APNEA: 0
WHEEZING: 0
DIARRHEA: 0
SHORTNESS OF BREATH: 0
COUGH: 0
NAUSEA: 0
ABDOMINAL PAIN: 0
EYE PAIN: 0
CONSTIPATION: 0
VOICE CHANGE: 0
TROUBLE SWALLOWING: 0
BACK PAIN: 0
BLOOD IN STOOL: 0

## 2025-07-24 NOTE — PROGRESS NOTES
7/24/25    Dario Coombs  1995  30 y.o. male     Adult Annual Preventive Visit   Chief Complaint   Patient presents with    Medication Check     Pt needs meds for going out of the country.    Annual Exam     History of Present Illness  The patient presents for a travel consultation.    He maintains a moderate exercise routine and follows a healthy diet. His social support network is strong, and he manages stress well. He reports no fevers, excessive fatigue, nausea, vomiting, diarrhea, constipation, abdominal pain, blood in stool or urine, nosebleeds, difficulty swallowing, voice changes, cough, wheezing, shortness of breath, chest pain, leg swelling, heart palpitations, breathing issues at night, vision problems, eye pain, heat or cold intolerance, urinary issues, joint pain, back pain, mobility issues, neck pain, skin rashes or wounds, suicidal thoughts, anxiety, depression, concentration issues, swollen glands, easy bleeding or bruising, speech difficulties, seizures, numbness, headaches, lightheadedness, dizziness, food allergies, pollen allergies, or immune system issues. He was informed about the need for Malarone once a week as an antimalarial medication. He is uncertain about the requirement for a typhoid vaccine. His travel duration is approximately 2 weeks.    He is currently on blood pressure medication and does not require a refill at this time. His cardiologist suggested that he may need to continue this medication indefinitely. He has reduced his sodium intake and incorporated cardio workouts into his routine. He is considering magnesium supplements as a natural way to lower his blood pressure. He recalls a period when he discontinued his blood pressure medication for half a week to try TMG but had to resume it due to a rise in his blood pressure.    He has been experiencing sleep disturbances, waking up between 4 to 12 times per night. He underwent an in-house sleep study.    Social History:  Diet: He

## 2025-07-24 NOTE — PATIENT INSTRUCTIONS
hands, brush your teeth twice a day, and wear a seat belt in the car.   Where can you learn more?  Go to https://www.[employer].net/patientEd and enter P072 to learn more about \"Well Visit, Ages 18 to 65: Care Instructions.\"  Current as of: April 30, 2024  Content Version: 14.5  © 4041-5488 [employer].   Care instructions adapted under license by [employer]. If you have questions about a medical condition or this instruction, always ask your healthcare professional. [employer], disclaims any warranty or liability for your use of this information.

## 2025-07-31 SDOH — ECONOMIC STABILITY: INCOME INSECURITY: IN THE LAST 12 MONTHS, WAS THERE A TIME WHEN YOU WERE NOT ABLE TO PAY THE MORTGAGE OR RENT ON TIME?: NO

## 2025-07-31 SDOH — ECONOMIC STABILITY: FOOD INSECURITY: WITHIN THE PAST 12 MONTHS, THE FOOD YOU BOUGHT JUST DIDN'T LAST AND YOU DIDN'T HAVE MONEY TO GET MORE.: NEVER TRUE

## 2025-07-31 SDOH — ECONOMIC STABILITY: FOOD INSECURITY: WITHIN THE PAST 12 MONTHS, YOU WORRIED THAT YOUR FOOD WOULD RUN OUT BEFORE YOU GOT MONEY TO BUY MORE.: NEVER TRUE

## 2025-07-31 SDOH — ECONOMIC STABILITY: TRANSPORTATION INSECURITY
IN THE PAST 12 MONTHS, HAS THE LACK OF TRANSPORTATION KEPT YOU FROM MEDICAL APPOINTMENTS OR FROM GETTING MEDICATIONS?: NO

## 2025-07-31 SDOH — ECONOMIC STABILITY: TRANSPORTATION INSECURITY
IN THE PAST 12 MONTHS, HAS LACK OF TRANSPORTATION KEPT YOU FROM MEETINGS, WORK, OR FROM GETTING THINGS NEEDED FOR DAILY LIVING?: NO

## 2025-08-01 ENCOUNTER — OFFICE VISIT (OUTPATIENT)
Dept: FAMILY MEDICINE CLINIC | Age: 30
End: 2025-08-01
Payer: COMMERCIAL

## 2025-08-01 VITALS
SYSTOLIC BLOOD PRESSURE: 130 MMHG | WEIGHT: 213 LBS | HEART RATE: 76 BPM | DIASTOLIC BLOOD PRESSURE: 84 MMHG | OXYGEN SATURATION: 97 % | TEMPERATURE: 98.4 F | BODY MASS INDEX: 27.35 KG/M2

## 2025-08-01 DIAGNOSIS — N64.4 BREAST PAIN IN MALE: Primary | ICD-10-CM

## 2025-08-01 DIAGNOSIS — L03.90 CELLULITIS, UNSPECIFIED CELLULITIS SITE: ICD-10-CM

## 2025-08-01 PROCEDURE — G8427 DOCREV CUR MEDS BY ELIG CLIN: HCPCS | Performed by: PHYSICIAN ASSISTANT

## 2025-08-01 PROCEDURE — 99213 OFFICE O/P EST LOW 20 MIN: CPT | Performed by: PHYSICIAN ASSISTANT

## 2025-08-01 PROCEDURE — G8419 CALC BMI OUT NRM PARAM NOF/U: HCPCS | Performed by: PHYSICIAN ASSISTANT

## 2025-08-01 PROCEDURE — 3075F SYST BP GE 130 - 139MM HG: CPT | Performed by: PHYSICIAN ASSISTANT

## 2025-08-01 PROCEDURE — 4004F PT TOBACCO SCREEN RCVD TLK: CPT | Performed by: PHYSICIAN ASSISTANT

## 2025-08-01 PROCEDURE — 3079F DIAST BP 80-89 MM HG: CPT | Performed by: PHYSICIAN ASSISTANT

## 2025-08-01 RX ORDER — DOXYCYCLINE HYCLATE 100 MG
100 TABLET ORAL 2 TIMES DAILY
Qty: 20 TABLET | Refills: 0 | Status: SHIPPED | OUTPATIENT
Start: 2025-08-01 | End: 2025-08-11

## 2025-08-01 ASSESSMENT — ENCOUNTER SYMPTOMS
COUGH: 0
COLOR CHANGE: 1
BACK PAIN: 0
VOMITING: 0
EYE PAIN: 0
SHORTNESS OF BREATH: 0
NAUSEA: 0
RHINORRHEA: 0
CHEST TIGHTNESS: 0
ABDOMINAL PAIN: 0
CONSTIPATION: 0
WHEEZING: 0
DIARRHEA: 0
EYE DISCHARGE: 0
BLOOD IN STOOL: 0
EYE REDNESS: 0
PHOTOPHOBIA: 0
SORE THROAT: 0

## 2025-08-01 NOTE — PROGRESS NOTES
Dario Coombs (:  1995) is a 30 y.o. male,Established patient, here for evaluation of the following chief complaint(s):    No chief complaint on file.        ASSESSMENT/PLAN:  Assessment & Plan  1. Chest lesion.  - Symptoms suggest a developing abscess, pus pocket, or infection, possibly cellulitis. Cyst considered, although atypical. Mastitis or gynecomastia discussed.  - Physical exam reveals redness, warmth, and a slight lump. No discharge. No recent trauma or injury. No history of similar skin problems or cysts.  - Monitor condition before starting antibiotics. If no improvement, consider ultrasound. Protect skin from sun exposure while on doxycycline.  - Prescription for doxycycline 100 mg, twice daily for 10 days. Seek immediate medical attention if symptoms worsen or systemic symptoms occur.    2. Medication management.  - Currently taking Malarone for malaria prevention for travel to [place] on 2025. Have not started the medication yet.  - Have not received the typhoid vaccine due to potential interactions with Malarone.  - No recent changes to medications.  1. Breast pain in male  -     doxycycline hyclate (VIBRA-TABS) 100 MG tablet; Take 1 tablet by mouth 2 times daily for 10 days, Disp-20 tablet, R-0Print  2. Cellulitis, unspecified cellulitis site  -     doxycycline hyclate (VIBRA-TABS) 100 MG tablet; Take 1 tablet by mouth 2 times daily for 10 days, Disp-20 tablet, R-0Print      Return if symptoms worsen or fail to improve, for Follow Up.      SUBJECTIVE/OBJECTIVE:  HPI  History of Present Illness  The patient presents for evaluation of a chest lesion.    They report waking up yesterday with a sensitive area on their chest, initially attributed to scratching or rubbing against clothing. The area developed redness, swelling, and warmth, worsening yesterday but improving today. The lesion is slightly red, hard, with mild pain, and no discharge. No recent trauma or injury. Similar incident 10-15

## 2025-08-15 ENCOUNTER — PATIENT MESSAGE (OUTPATIENT)
Dept: FAMILY MEDICINE CLINIC | Age: 30
End: 2025-08-15

## 2025-08-15 DIAGNOSIS — N63.20 MASS OF LEFT BREAST, UNSPECIFIED QUADRANT: Primary | ICD-10-CM

## 2025-08-18 ENCOUNTER — TELEPHONE (OUTPATIENT)
Age: 30
End: 2025-08-18

## 2025-08-18 DIAGNOSIS — N63.20 MASS OF LEFT BREAST, UNSPECIFIED QUADRANT: Primary | ICD-10-CM

## 2025-08-26 DIAGNOSIS — Z29.89 NEED FOR MALARIA PROPHYLAXIS: ICD-10-CM

## 2025-08-26 RX ORDER — ATOVAQUONE AND PROGUANIL HYDROCHLORIDE 250; 100 MG/1; MG/1
1 TABLET, FILM COATED ORAL DAILY
Qty: 21 TABLET | Refills: 0 | Status: SHIPPED | OUTPATIENT
Start: 2025-08-26